# Patient Record
Sex: MALE | Race: WHITE | NOT HISPANIC OR LATINO
[De-identification: names, ages, dates, MRNs, and addresses within clinical notes are randomized per-mention and may not be internally consistent; named-entity substitution may affect disease eponyms.]

---

## 2017-02-06 ENCOUNTER — APPOINTMENT (OUTPATIENT)
Dept: PULMONOLOGY | Facility: CLINIC | Age: 72
End: 2017-02-06

## 2017-02-06 VITALS
OXYGEN SATURATION: 96 % | BODY MASS INDEX: 37.67 KG/M2 | RESPIRATION RATE: 17 BRPM | DIASTOLIC BLOOD PRESSURE: 60 MMHG | SYSTOLIC BLOOD PRESSURE: 120 MMHG | TEMPERATURE: 98.1 F | WEIGHT: 240 LBS | HEART RATE: 75 BPM | HEIGHT: 67 IN

## 2017-02-13 ENCOUNTER — RESULT REVIEW (OUTPATIENT)
Age: 72
End: 2017-02-13

## 2017-04-20 ENCOUNTER — APPOINTMENT (OUTPATIENT)
Dept: CARDIOLOGY | Facility: CLINIC | Age: 72
End: 2017-04-20

## 2017-04-20 ENCOUNTER — NON-APPOINTMENT (OUTPATIENT)
Age: 72
End: 2017-04-20

## 2017-04-20 VITALS
HEART RATE: 71 BPM | HEIGHT: 67 IN | SYSTOLIC BLOOD PRESSURE: 146 MMHG | WEIGHT: 238 LBS | BODY MASS INDEX: 37.35 KG/M2 | DIASTOLIC BLOOD PRESSURE: 75 MMHG | OXYGEN SATURATION: 98 %

## 2017-05-05 ENCOUNTER — APPOINTMENT (OUTPATIENT)
Dept: PULMONOLOGY | Facility: CLINIC | Age: 72
End: 2017-05-05

## 2017-05-05 VITALS
BODY MASS INDEX: 36.57 KG/M2 | HEIGHT: 67 IN | RESPIRATION RATE: 18 BRPM | SYSTOLIC BLOOD PRESSURE: 120 MMHG | WEIGHT: 233 LBS | HEART RATE: 67 BPM | OXYGEN SATURATION: 97 % | DIASTOLIC BLOOD PRESSURE: 80 MMHG | TEMPERATURE: 97.5 F

## 2017-10-26 ENCOUNTER — NON-APPOINTMENT (OUTPATIENT)
Age: 72
End: 2017-10-26

## 2017-10-26 ENCOUNTER — APPOINTMENT (OUTPATIENT)
Dept: CARDIOLOGY | Facility: CLINIC | Age: 72
End: 2017-10-26
Payer: MEDICARE

## 2017-10-26 VITALS — DIASTOLIC BLOOD PRESSURE: 71 MMHG | OXYGEN SATURATION: 98 % | HEART RATE: 72 BPM | SYSTOLIC BLOOD PRESSURE: 134 MMHG

## 2017-10-26 PROCEDURE — 99215 OFFICE O/P EST HI 40 MIN: CPT

## 2017-10-26 PROCEDURE — 93000 ELECTROCARDIOGRAM COMPLETE: CPT

## 2018-05-16 ENCOUNTER — APPOINTMENT (OUTPATIENT)
Dept: CARDIOLOGY | Facility: CLINIC | Age: 73
End: 2018-05-16
Payer: MEDICARE

## 2018-05-16 ENCOUNTER — NON-APPOINTMENT (OUTPATIENT)
Age: 73
End: 2018-05-16

## 2018-05-16 VITALS
OXYGEN SATURATION: 98 % | WEIGHT: 217 LBS | BODY MASS INDEX: 33.99 KG/M2 | DIASTOLIC BLOOD PRESSURE: 63 MMHG | HEART RATE: 74 BPM | SYSTOLIC BLOOD PRESSURE: 130 MMHG

## 2018-05-16 PROCEDURE — 99214 OFFICE O/P EST MOD 30 MIN: CPT

## 2018-05-16 PROCEDURE — 93000 ELECTROCARDIOGRAM COMPLETE: CPT

## 2018-11-28 ENCOUNTER — APPOINTMENT (OUTPATIENT)
Dept: PULMONOLOGY | Facility: CLINIC | Age: 73
End: 2018-11-28
Payer: MEDICARE

## 2018-11-28 VITALS
OXYGEN SATURATION: 96 % | WEIGHT: 220 LBS | HEART RATE: 75 BPM | DIASTOLIC BLOOD PRESSURE: 70 MMHG | TEMPERATURE: 97.9 F | RESPIRATION RATE: 15 BRPM | BODY MASS INDEX: 34.53 KG/M2 | HEIGHT: 67 IN | SYSTOLIC BLOOD PRESSURE: 140 MMHG

## 2018-11-28 PROCEDURE — 99214 OFFICE O/P EST MOD 30 MIN: CPT

## 2018-11-28 RX ORDER — MULTIVIT-MIN/FOLIC/VIT K/LYCOP 400-300MCG
50 MCG TABLET ORAL
Refills: 0 | Status: ACTIVE | COMMUNITY

## 2018-11-28 RX ORDER — PROMETHAZINE HYDROCHLORIDE AND CODEINE PHOSPHATE 6.25; 1 MG/5ML; MG/5ML
6.25-1 SOLUTION ORAL EVERY 4 HOURS
Qty: 100 | Refills: 0 | Status: COMPLETED | COMMUNITY
Start: 2017-04-20 | End: 2017-05-01

## 2018-12-01 ENCOUNTER — APPOINTMENT (OUTPATIENT)
Dept: CARDIOLOGY | Facility: CLINIC | Age: 73
End: 2018-12-01
Payer: MEDICARE

## 2018-12-01 ENCOUNTER — NON-APPOINTMENT (OUTPATIENT)
Age: 73
End: 2018-12-01

## 2018-12-01 VITALS
SYSTOLIC BLOOD PRESSURE: 125 MMHG | WEIGHT: 220 LBS | HEART RATE: 67 BPM | HEIGHT: 67 IN | DIASTOLIC BLOOD PRESSURE: 74 MMHG | OXYGEN SATURATION: 98 % | BODY MASS INDEX: 34.53 KG/M2

## 2018-12-01 PROCEDURE — 93000 ELECTROCARDIOGRAM COMPLETE: CPT

## 2018-12-01 PROCEDURE — 99214 OFFICE O/P EST MOD 30 MIN: CPT

## 2018-12-06 NOTE — PHYSICAL EXAM
[General Appearance - Well Developed] : well developed [Normal Appearance] : normal appearance [Well Groomed] : well groomed [General Appearance - Well Nourished] : well nourished [No Deformities] : no deformities [General Appearance - In No Acute Distress] : no acute distress [Normal Conjunctiva] : the conjunctiva exhibited no abnormalities [Eyelids - No Xanthelasma] : the eyelids demonstrated no xanthelasmas [Normal Oral Mucosa] : normal oral mucosa [No Oral Pallor] : no oral pallor [No Oral Cyanosis] : no oral cyanosis [Normal Jugular Venous A Waves Present] : normal jugular venous A waves present [Normal Jugular Venous V Waves Present] : normal jugular venous V waves present [No Jugular Venous Pompa A Waves] : no jugular venous pompa A waves [Respiration, Rhythm And Depth] : normal respiratory rhythm and effort [Exaggerated Use Of Accessory Muscles For Inspiration] : no accessory muscle use [Auscultation Breath Sounds / Voice Sounds] : lungs were clear to auscultation bilaterally [Heart Rate And Rhythm] : heart rate and rhythm were normal [Heart Sounds] : normal S1 and S2 [Murmurs] : no murmurs present [Abdomen Soft] : soft [Abdomen Tenderness] : non-tender [Abdomen Mass (___ Cm)] : no abdominal mass palpated [Abnormal Walk] : normal gait [Gait - Sufficient For Exercise Testing] : the gait was sufficient for exercise testing [Nail Clubbing] : no clubbing of the fingernails [Cyanosis, Localized] : no localized cyanosis [Petechial Hemorrhages (___cm)] : no petechial hemorrhages [Skin Color & Pigmentation] : normal skin color and pigmentation [] : no rash [No Venous Stasis] : no venous stasis [Skin Lesions] : no skin lesions [No Skin Ulcers] : no skin ulcer [No Xanthoma] : no  xanthoma was observed [Oriented To Time, Place, And Person] : oriented to person, place, and time [Affect] : the affect was normal [Mood] : the mood was normal [No Anxiety] : not feeling anxious [FreeTextEntry1] : right radial +2 pulse

## 2018-12-06 NOTE — HISTORY OF PRESENT ILLNESS
[FreeTextEntry1] : Bryon is returning for a follow-up.  \par \par No chest pains - no angina\par BP acceptable\par Taking lipid therapy - no side effects\par  \par \par \par

## 2018-12-06 NOTE — DISCUSSION/SUMMARY
[FreeTextEntry1] : Bryon Middleton is a 72 y/o with HTN and Hchol  - cath with severe small vessel CAD\par \par Cont with lipitor\par No angina\par BP acceptable\par Cont with present meds\par I remained concerned about his overall prognosis given his lack of exercise and weight.\par Will continue with aggressive medical therapy for now\par \par

## 2018-12-06 NOTE — REVIEW OF SYSTEMS
[Under Stress] : under stress [Negative] : Heme/Lymph [Dyspnea on exertion] : not dyspnea during exertion

## 2019-05-16 ENCOUNTER — APPOINTMENT (OUTPATIENT)
Dept: PULMONOLOGY | Facility: CLINIC | Age: 74
End: 2019-05-16
Payer: MEDICARE

## 2019-05-16 VITALS
HEIGHT: 67 IN | TEMPERATURE: 98 F | BODY MASS INDEX: 34.21 KG/M2 | HEART RATE: 70 BPM | DIASTOLIC BLOOD PRESSURE: 77 MMHG | SYSTOLIC BLOOD PRESSURE: 144 MMHG | OXYGEN SATURATION: 96 % | RESPIRATION RATE: 16 BRPM | WEIGHT: 218 LBS

## 2019-05-16 PROCEDURE — 99214 OFFICE O/P EST MOD 30 MIN: CPT | Mod: GC

## 2019-05-16 NOTE — REVIEW OF SYSTEMS
[EDS: ESS=____] : no daytime somnolence [Fatigue] : no fatigue [Nasal Congestion] : no nasal congestion [Snoring] : no snoring [Postnasal Drip] : no postnasal drip [Witnessed Apneas] : no witnessed apnea [Shortness Of Breath] : no shortness of breath [A.M. Dry Mouth] : no a.m. dry mouth [Chest Pain] : no chest pain [Obesity] : not obese [A.M. Headache] : no headache present upon awakening [Sleep Paralysis] : no sleep paralysis [Hypnogogic Hallucinations] : no hypnogogic hallucinations [Heartburn] : no heartburn [Nocturia] : no nocturia [Arthralgias] : no arthralgias [Difficulty Initiating Sleep] : no difficulty falling asleep [Difficulty Maintaining Sleep] : no difficulty maintaining sleep [Acute Insomnia] : no acute insomnia [Lower Extremity Discomfort] : no lower extremity discomfort [Irresistible urge to move legs] : no irresistible urge to move legs because of lower extremity discomfort [Late day/ Evening symptoms] : no late day/evening symptoms [Sleep Disturbances due to LE symptoms] : ~T no sleep disturbances due to lower extremity symptoms [Unusual Sleep Behavior] : no unusual sleep behavior [Hypersomnolence] : not sleeping much more than usual

## 2019-05-16 NOTE — HISTORY OF PRESENT ILLNESS
[FreeTextEntry1] : This 73 year year old male with severe obstructive sleep apnea treated with APAP therapy presents for follow-up \par \par COMORBID: CAD, HTN, HLD, ED, and obesity.\par \par The patient has been treated with APAP therapy since Jan/2017, after he was diagnosed with severe KAIDEN in Nov/2016. He was intolerant of bilevel. He reports almost nightly APAP use for 6-8 hours, with resolution of pre-therapeutic symptoms including snoring, witnessed apneas, hypersomnolence, and nonrestorative sleep. He has not been utilizing APAP when he travels. The patient reports no problems with therapy or with nasal pillows mask, except for morning dry mouth. \par \par He is sleeping approximately 7 hours between 11PM-6AM, taking 1/2 hour to fall asleep, with one brief awakening to urinate. He takes a 1/2-hour afternoon refeshing nap ~ 3 times weekly, and denies drowsy driving. \par \par Sleep related symptoms: ESS score 0. \par \par Date of most recent diagnostic polysomnogram: 11/23/2016 Split study at [facility]. Apnea-hypopnea index: 38.8 per hour. T90%: 13%. Satnam desaturation: 53%. \par    \par Therapeutic Polysomnogram:. the most recent therapeutic polysomnogram was completed 11/23/2016 Split study at [facility]. AHI of 13 on Bilevel 14/10 cmH2O with nasal mask. \par \par Treatment: CPAP: APAP 4-20 cmH2O. Set-up 1/17/2017 by [facility] with [employer] CPAP.\par \par  \par PAP Therapy Compliance:. Days used: 83.3 %. Days used > 4 hrs: 73.3 %. Mean nightly usage: 5 hrs 33 mins . Therapy based AHI: 4.4 / hr. \par  \par

## 2019-05-16 NOTE — PHYSICAL EXAM
[General Appearance - Well Developed] : well developed [Apical Impulse] : the apical impulse was normal [Neck Cervical Mass (___cm)] : no neck mass was observed [Neck Appearance] : the appearance of the neck was normal [Heart Rate And Rhythm] : heart rate was normal and rhythm regular [Heart Sounds Gallop] : no gallops [] : no respiratory distress [Bowel Sounds] : normal bowel sounds [Abdomen Tenderness] : non-tender [Abnormal Walk] : normal gait [Nail Clubbing] : no clubbing  or cyanosis of the fingernails [Low Lying Soft Palate] : low lying soft palate [Enlarged Base of the Tongue] : enlargement of the base of the tongue [Deep Tendon Reflexes (DTR)] : deep tendon reflexes were 2+ and symmetric [Sensation] : the sensory exam was normal to light touch and pinprick [No Focal Deficits] : no focal deficits [Oriented To Time, Place, And Person] : oriented to person, place, and time [Impaired Insight] : insight and judgment were intact [Affect] : the affect was normal

## 2019-05-16 NOTE — ASSESSMENT
[FreeTextEntry1] : 73 year old with comorbid CAD, HTN, HLD, ED, and obesity (BMI 34.5); presents for continued management of severe obstructive sleep apnea.\par \par The patient is benefitting from APAP therapy with continued resolution of KAIDEN-related symptoms and should continue APAP therapy at the current pressure settings. The importance of continuous APAP compliance while travelling was stressed, to reduce the health risk of untreated severe KAIDEN. He inquired about hypoglossal nerve stimulation therapy, the information was presented to the patient and the spouse. -- he stated he will continue with pap therpay and does not want to pursue inspire at this time.  A letter to travel with CPAP was provided. \par \par He will follow up in one year or sooner if needed.\par

## 2019-08-21 ENCOUNTER — APPOINTMENT (OUTPATIENT)
Dept: CARDIOLOGY | Facility: CLINIC | Age: 74
End: 2019-08-21

## 2019-10-10 ENCOUNTER — APPOINTMENT (OUTPATIENT)
Dept: CARDIOLOGY | Facility: CLINIC | Age: 74
End: 2019-10-10
Payer: MEDICARE

## 2019-10-10 ENCOUNTER — NON-APPOINTMENT (OUTPATIENT)
Age: 74
End: 2019-10-10

## 2019-10-10 VITALS
DIASTOLIC BLOOD PRESSURE: 67 MMHG | OXYGEN SATURATION: 97 % | SYSTOLIC BLOOD PRESSURE: 118 MMHG | HEIGHT: 67 IN | HEART RATE: 67 BPM

## 2019-10-10 PROCEDURE — 93000 ELECTROCARDIOGRAM COMPLETE: CPT

## 2019-10-10 PROCEDURE — 99214 OFFICE O/P EST MOD 30 MIN: CPT | Mod: 25

## 2019-10-23 NOTE — DISCUSSION/SUMMARY
[FreeTextEntry1] : Bryon Middleton is a 73 y/o with HTN and Hchol  - cath with severe small vessel CAD\par \par Cont with lipitor\par No angina\par BP acceptable\par Cont with present meds\par I remained concerned about his overall prognosis given his lack of exercise and weight.\par Will continue with aggressive medical therapy plan on repeat ischemic eval next year\par   \par \par

## 2019-10-23 NOTE — PHYSICAL EXAM
[General Appearance - Well Developed] : well developed [Normal Appearance] : normal appearance [General Appearance - Well Nourished] : well nourished [Well Groomed] : well groomed [No Deformities] : no deformities [General Appearance - In No Acute Distress] : no acute distress [Normal Conjunctiva] : the conjunctiva exhibited no abnormalities [Eyelids - No Xanthelasma] : the eyelids demonstrated no xanthelasmas [Normal Oral Mucosa] : normal oral mucosa [No Oral Cyanosis] : no oral cyanosis [No Oral Pallor] : no oral pallor [Normal Jugular Venous A Waves Present] : normal jugular venous A waves present [Normal Jugular Venous V Waves Present] : normal jugular venous V waves present [No Jugular Venous Pompa A Waves] : no jugular venous pompa A waves [Respiration, Rhythm And Depth] : normal respiratory rhythm and effort [Exaggerated Use Of Accessory Muscles For Inspiration] : no accessory muscle use [Auscultation Breath Sounds / Voice Sounds] : lungs were clear to auscultation bilaterally [Heart Sounds] : normal S1 and S2 [Heart Rate And Rhythm] : heart rate and rhythm were normal [Murmurs] : no murmurs present [Abdomen Tenderness] : non-tender [Abdomen Soft] : soft [Abdomen Mass (___ Cm)] : no abdominal mass palpated [Gait - Sufficient For Exercise Testing] : the gait was sufficient for exercise testing [Abnormal Walk] : normal gait [Nail Clubbing] : no clubbing of the fingernails [Cyanosis, Localized] : no localized cyanosis [Petechial Hemorrhages (___cm)] : no petechial hemorrhages [Skin Color & Pigmentation] : normal skin color and pigmentation [] : no rash [No Venous Stasis] : no venous stasis [Skin Lesions] : no skin lesions [No Skin Ulcers] : no skin ulcer [No Xanthoma] : no  xanthoma was observed [Oriented To Time, Place, And Person] : oriented to person, place, and time [Affect] : the affect was normal [Mood] : the mood was normal [No Anxiety] : not feeling anxious [FreeTextEntry1] : right radial +2 pulse

## 2019-10-23 NOTE — HISTORY OF PRESENT ILLNESS
[FreeTextEntry1] : Bryon is returning for a follow-up.  \par \par Was recently in [facility]. Felt well. No health issues over that period of time.\par No chest pains - no angina\par BP acceptable\par Taking lipid therapy - no side effects\par  \par \par \par

## 2020-06-18 ENCOUNTER — NON-APPOINTMENT (OUTPATIENT)
Age: 75
End: 2020-06-18

## 2020-06-18 ENCOUNTER — APPOINTMENT (OUTPATIENT)
Dept: CARDIOLOGY | Facility: CLINIC | Age: 75
End: 2020-06-18
Payer: MEDICARE

## 2020-06-18 VITALS
WEIGHT: 200 LBS | HEART RATE: 71 BPM | HEIGHT: 67 IN | OXYGEN SATURATION: 98 % | BODY MASS INDEX: 31.39 KG/M2 | DIASTOLIC BLOOD PRESSURE: 75 MMHG | SYSTOLIC BLOOD PRESSURE: 129 MMHG

## 2020-06-18 PROCEDURE — 99213 OFFICE O/P EST LOW 20 MIN: CPT

## 2020-06-18 PROCEDURE — 93000 ELECTROCARDIOGRAM COMPLETE: CPT

## 2020-06-18 NOTE — REASON FOR VISIT
[Follow-Up - Clinic] : a clinic follow-up of [Coronary Artery Disease] : coronary artery disease [Hypertension] : hypertension [Medication Management] : Medication management [Hyperlipidemia] : hyperlipidemia

## 2020-06-20 NOTE — HISTORY OF PRESENT ILLNESS
[FreeTextEntry1] : Bryon is returning for a follow-up.  \par  \par No chest pains - no angina\par BP acceptable\par Taking lipid therapy - no side effects\par Hasnt lost sign weight\par Does garden work without limitations\par \par \par \par

## 2020-06-20 NOTE — PHYSICAL EXAM
[Normal Appearance] : normal appearance [General Appearance - Well Developed] : well developed [Well Groomed] : well groomed [General Appearance - Well Nourished] : well nourished [No Deformities] : no deformities [General Appearance - In No Acute Distress] : no acute distress [Normal Conjunctiva] : the conjunctiva exhibited no abnormalities [Eyelids - No Xanthelasma] : the eyelids demonstrated no xanthelasmas [Normal Oral Mucosa] : normal oral mucosa [No Oral Pallor] : no oral pallor [No Oral Cyanosis] : no oral cyanosis [Normal Jugular Venous A Waves Present] : normal jugular venous A waves present [Normal Jugular Venous V Waves Present] : normal jugular venous V waves present [No Jugular Venous Pompa A Waves] : no jugular venous pompa A waves [Respiration, Rhythm And Depth] : normal respiratory rhythm and effort [Exaggerated Use Of Accessory Muscles For Inspiration] : no accessory muscle use [Auscultation Breath Sounds / Voice Sounds] : lungs were clear to auscultation bilaterally [Heart Sounds] : normal S1 and S2 [Heart Rate And Rhythm] : heart rate and rhythm were normal [Murmurs] : no murmurs present [Abdomen Tenderness] : non-tender [Abdomen Soft] : soft [Abdomen Mass (___ Cm)] : no abdominal mass palpated [Abnormal Walk] : normal gait [Gait - Sufficient For Exercise Testing] : the gait was sufficient for exercise testing [Cyanosis, Localized] : no localized cyanosis [Nail Clubbing] : no clubbing of the fingernails [Petechial Hemorrhages (___cm)] : no petechial hemorrhages [Skin Color & Pigmentation] : normal skin color and pigmentation [No Venous Stasis] : no venous stasis [] : no rash [No Skin Ulcers] : no skin ulcer [Skin Lesions] : no skin lesions [No Xanthoma] : no  xanthoma was observed [Oriented To Time, Place, And Person] : oriented to person, place, and time [Affect] : the affect was normal [Mood] : the mood was normal [No Anxiety] : not feeling anxious [FreeTextEntry1] : right radial +2 pulse

## 2020-06-20 NOTE — DISCUSSION/SUMMARY
[FreeTextEntry1] : Bryon Middleton is a 74 y/o with HTN and Hchol  - cath with severe small vessel CAD\par \par Cont with lipitor\par No angina\par BP acceptable\par Cont with present meds\par   \par \par

## 2020-12-03 ENCOUNTER — APPOINTMENT (OUTPATIENT)
Dept: CARDIOLOGY | Facility: CLINIC | Age: 75
End: 2020-12-03
Payer: MEDICARE

## 2020-12-03 ENCOUNTER — NON-APPOINTMENT (OUTPATIENT)
Age: 75
End: 2020-12-03

## 2020-12-03 VITALS — OXYGEN SATURATION: 99 % | DIASTOLIC BLOOD PRESSURE: 56 MMHG | HEART RATE: 65 BPM | SYSTOLIC BLOOD PRESSURE: 120 MMHG

## 2020-12-03 PROCEDURE — 93000 ELECTROCARDIOGRAM COMPLETE: CPT

## 2020-12-03 PROCEDURE — 99213 OFFICE O/P EST LOW 20 MIN: CPT

## 2020-12-03 PROCEDURE — 99072 ADDL SUPL MATRL&STAF TM PHE: CPT

## 2020-12-03 NOTE — REVIEW OF SYSTEMS
[Under Stress] : under stress [Negative] : Heme/Lymph [Feeling Fatigued] : feeling fatigued [Dyspnea on exertion] : not dyspnea during exertion

## 2020-12-03 NOTE — PHYSICAL EXAM
[General Appearance - Well Developed] : well developed [Normal Appearance] : normal appearance [Well Groomed] : well groomed [General Appearance - Well Nourished] : well nourished [No Deformities] : no deformities [General Appearance - In No Acute Distress] : no acute distress [Normal Conjunctiva] : the conjunctiva exhibited no abnormalities [Eyelids - No Xanthelasma] : the eyelids demonstrated no xanthelasmas [Normal Oral Mucosa] : normal oral mucosa [No Oral Pallor] : no oral pallor [No Oral Cyanosis] : no oral cyanosis [Normal Jugular Venous A Waves Present] : normal jugular venous A waves present [Normal Jugular Venous V Waves Present] : normal jugular venous V waves present [No Jugular Venous Pompa A Waves] : no jugular venous pompa A waves [Respiration, Rhythm And Depth] : normal respiratory rhythm and effort [Exaggerated Use Of Accessory Muscles For Inspiration] : no accessory muscle use [Auscultation Breath Sounds / Voice Sounds] : lungs were clear to auscultation bilaterally [Heart Rate And Rhythm] : heart rate and rhythm were normal [Heart Sounds] : normal S1 and S2 [Murmurs] : no murmurs present [Abdomen Soft] : soft [Abdomen Tenderness] : non-tender [Abdomen Mass (___ Cm)] : no abdominal mass palpated [Abnormal Walk] : normal gait [Gait - Sufficient For Exercise Testing] : the gait was sufficient for exercise testing [Nail Clubbing] : no clubbing of the fingernails [Cyanosis, Localized] : no localized cyanosis [Petechial Hemorrhages (___cm)] : no petechial hemorrhages [Skin Color & Pigmentation] : normal skin color and pigmentation [] : no rash [No Venous Stasis] : no venous stasis [Skin Lesions] : no skin lesions [No Skin Ulcers] : no skin ulcer [No Xanthoma] : no  xanthoma was observed [Oriented To Time, Place, And Person] : oriented to person, place, and time [Affect] : the affect was normal [Mood] : the mood was normal [No Anxiety] : not feeling anxious [Edema] : no peripheral edema present [FreeTextEntry1] : right radial +2 pulse

## 2020-12-03 NOTE — HISTORY OF PRESENT ILLNESS
[FreeTextEntry1] : Bryon is returning for a follow-up.  \par  \par No chest pains - no angina\par BP acceptable\par Taking lipid therapy - no side effects\par Does garden work without limitations\par No LE edema\par \par \par \par

## 2020-12-04 LAB
ALBUMIN SERPL ELPH-MCNC: 4.8 G/DL
ALP BLD-CCNC: 57 U/L
ALT SERPL-CCNC: 16 U/L
ANION GAP SERPL CALC-SCNC: 10 MMOL/L
AST SERPL-CCNC: 15 U/L
BILIRUB SERPL-MCNC: 0.6 MG/DL
BUN SERPL-MCNC: 14 MG/DL
CALCIUM SERPL-MCNC: 9.8 MG/DL
CHLORIDE SERPL-SCNC: 99 MMOL/L
CHOLEST SERPL-MCNC: 123 MG/DL
CO2 SERPL-SCNC: 27 MMOL/L
CREAT SERPL-MCNC: 0.78 MG/DL
ESTIMATED AVERAGE GLUCOSE: 131 MG/DL
GLUCOSE SERPL-MCNC: 144 MG/DL
HBA1C MFR BLD HPLC: 6.2 %
HDLC SERPL-MCNC: 46 MG/DL
LDLC SERPL CALC-MCNC: 58 MG/DL
NONHDLC SERPL-MCNC: 77 MG/DL
POTASSIUM SERPL-SCNC: 4.5 MMOL/L
PROT SERPL-MCNC: 7.8 G/DL
PSA FREE FLD-MCNC: 43 %
PSA FREE SERPL-MCNC: 0.38 NG/ML
PSA SERPL-MCNC: 0.89 NG/ML
SODIUM SERPL-SCNC: 135 MMOL/L
TRIGL SERPL-MCNC: 95 MG/DL
TSH SERPL-ACNC: 2.52 UIU/ML

## 2020-12-28 ENCOUNTER — NON-APPOINTMENT (OUTPATIENT)
Age: 75
End: 2020-12-28

## 2021-02-02 ENCOUNTER — APPOINTMENT (OUTPATIENT)
Dept: PULMONOLOGY | Facility: CLINIC | Age: 76
End: 2021-02-02

## 2021-02-02 ENCOUNTER — APPOINTMENT (OUTPATIENT)
Dept: PULMONOLOGY | Facility: CLINIC | Age: 76
End: 2021-02-02
Payer: MEDICARE

## 2021-02-02 PROCEDURE — 99442: CPT

## 2021-02-02 NOTE — HISTORY OF PRESENT ILLNESS
[CPAP: ___ cmH2O] : CPAP: [unfilled] cmH2O [FreeTextEntry1] : This is a 75 year old male with severe KAIDEN, on APAP, with comorbid HTN, CAD, HLD, BPH, ED, presenting via telehealth for a follow-up visit. \par \par Split Sleep Study (11/23/2016) from an outside sleep center showed an AHI of 38.8/hr with a T 90 of 13 %, and optimal bilevel pressures of 14/10 cmH20. The patient was intolerant to Bilevel and he has been treated with APAP therapy since 2017. He uses APAP nightly for the duration of his sleep with continued improvement in his sleep quality with resolution to snoring. He denies AM headaches, unintentional sleep episodes during his wake hours, and drowsy driving. He sleeps 7-8 hours / night and feels refreshed in the mornings. He naps once a day after lunch for 15-20 minutes. He uses a nasal pillows mask which he finds comfortable but reports morning dry mouth. Patient and spouse denies nocturnal mouth breathing. The pressures are well tolerated. The [facility] has been providing a renewal of supplies regularly. \par \par The patient's wife is requesting a letter to be sent to their electric company, [employer] notifying them of PAP usage. \par \par No interim changes to his health and weight reported.  [Nocturnal Oxygen] : The patient does not use nocturnal oxygen

## 2021-02-02 NOTE — PHYSICAL EXAM
[General Appearance - In No Acute Distress] : no acute distress [FreeTextEntry1] : TELEPHONE VISIT --  PATIENT ABLE TO SPEAK IN COMPLETE FULL SENTENCES AND ANSWER QUESTIONS WITHOUT DIFFICULTY

## 2021-02-02 NOTE — REVIEW OF SYSTEMS
[A.M. Dry Mouth] : a.m. dry mouth [Awakes With Dry Mouth] : awakes with dry mouth [Fatigue] : no fatigue [Nasal Congestion] : no nasal congestion [Postnasal Drip] : no postnasal drip [Shortness Of Breath] : no shortness of breath [Chest Pain] : no chest pain [Palpitations] : no palpitations [CHF] : no congestive heart failure [Thyroid Disease] : no thyroid disease [Diabetes] : no diabetes  [History of Iron Deficiency] : no history of iron deficiency [A.M. Headache] : no headache present upon awakening [Heartburn] : no heartburn [Nocturia] : no nocturia [Arthralgias] : no arthralgias [Depression] : no depression [Anxious] : not anxious [Snoring] : no snoring [Witnessed Apneas] : demonstrated no ~M apnea [Frequent Nocturnal Awakenings] : no nocturnal awakenings from sleep [Unintentional Sleep while active] : no unintentional sleep while active [Unintentional Sleep while inactive] : no unintentional sleep while inactive [Awakes Unrefreshed] : restorative sleep [Awakes With Headache] : awakes without a headache [Recent Wt Loss (___ Lbs)] : no recent weight loss [Recent Wt Gain (___ Lbs)] : no recent weight gain [Difficulty Initiating Sleep] : no difficulty falling asleep [Difficulty Maintaining Sleep] : no difficulty maintaining sleep [Irresistible urge to move legs] : no irresistible urge to move legs because of lower extremity discomfort [LE discomfort relieved by movement] : lower extremity discomfort not relieved by movement [Unusual Sleep Behavior] : no unusual sleep behavior [Hypersomnolence] : not sleeping much more than usual [Cataplexy] :  no cataplexy [Hypnogogic Hallucinations] : no hypnogogic hallucinations [Hypnopompic Hallucinations] : no hypnopompic hallucinations [Sleep Paralysis] : no sleep paralysis [FreeTextEntry1] : 11 - 11:30 pm  [FreeTextEntry2] : 7 am  [FreeTextEntry3] : 10 minutes  [FreeTextEntry4] : 0 [FreeTextEntry5] : n/a [FreeTextEntry6] : 7-8 hours  [FreeTextEntry7] : once a day after lunch around 2/3 pm for 15-20 minutes

## 2021-02-02 NOTE — REASON FOR VISIT
[Follow-Up] : a follow-up visit [Sleep Apnea] : sleep apnea [Home] : at home, [unfilled] , at the time of the visit. [Medical Office: ([facility])___] : at the medical office located in  [Spouse] : spouse [Verbal consent obtained from patient] : the patient, [unfilled]

## 2021-02-02 NOTE — ASSESSMENT
[FreeTextEntry1] : 76 y/o male with h/o severe KAIDEN (AHI 38.8/hr, diagnosed in 2016 via Split Sleep Study at an outside center), intolerant to Bilevel therapy, currently on APAP, with comorbid HTN, CAD, HLD, BPH, ED, here via telehealth for a follow-up visit. \par \par Therapeutic and compliance data download reveals usage 100% of days with an average use of 6 hours and 22 minutes. Therapy based AHI is 5.4/hr on 4-20 cm H2O, median pressure of 8.3 cmH20;with no significant mask leak.\par \par --The patient is experiencing benefit from APAP and will continue with nightly use as prescribed.\par --Advised patient to increase humidifier level for AM dry mouth in the setting of no mask leak and patient is not a nocturnal mouth breather.\par --Letter to [employer] in [employer] already from 12/28/20 was faxed to [employer] at [phone], as requested by patient's spouse and patient. \par --Rx for renewal of pap supplies x 1 year placed with [facility]\par The ramifications of untreated KAIDEN and the importance of continued treatment were discussed with the patient.\par F/u in 1 year or sooner if needed.\par

## 2021-05-25 ENCOUNTER — APPOINTMENT (OUTPATIENT)
Dept: BARIATRICS/WEIGHT MGMT | Facility: CLINIC | Age: 76
End: 2021-05-25

## 2021-06-03 ENCOUNTER — APPOINTMENT (OUTPATIENT)
Dept: CARDIOLOGY | Facility: CLINIC | Age: 76
End: 2021-06-03
Payer: MEDICARE

## 2021-06-03 VITALS
OXYGEN SATURATION: 98 % | HEART RATE: 67 BPM | BODY MASS INDEX: 33.74 KG/M2 | SYSTOLIC BLOOD PRESSURE: 124 MMHG | HEIGHT: 67 IN | DIASTOLIC BLOOD PRESSURE: 60 MMHG | WEIGHT: 215 LBS

## 2021-06-03 PROCEDURE — 99072 ADDL SUPL MATRL&STAF TM PHE: CPT

## 2021-06-03 PROCEDURE — 93000 ELECTROCARDIOGRAM COMPLETE: CPT

## 2021-06-03 PROCEDURE — 99213 OFFICE O/P EST LOW 20 MIN: CPT

## 2021-06-04 ENCOUNTER — NON-APPOINTMENT (OUTPATIENT)
Age: 76
End: 2021-06-04

## 2021-06-04 NOTE — HISTORY OF PRESENT ILLNESS
[FreeTextEntry1] : Bryon is returning for a follow-up.  \par  \par No chest pains - no angina\par BP acceptable\par Taking lipid therapy - no side effects\par No LE edema\par Has not lost weight\par \par \par

## 2021-06-04 NOTE — REASON FOR VISIT
[Follow-Up - Clinic] : a clinic follow-up of [Medication Management] : Medication management [Hyperlipidemia] : hyperlipidemia [Hypertension] : hypertension [Coronary Artery Disease] : coronary artery disease

## 2021-06-04 NOTE — PHYSICAL EXAM
[Well Developed] : well developed [Well Nourished] : well nourished [No Acute Distress] : no acute distress [Normal Conjunctiva] : normal conjunctiva [Normal Venous Pressure] : normal venous pressure [Carotid Bruit] : carotid bruit [Normal S1, S2] : normal S1, S2 [No Murmur] : no murmur [No Rub] : no rub [No Gallop] : no gallop [Clear Lung Fields] : clear lung fields [Good Air Entry] : good air entry [No Respiratory Distress] : no respiratory distress  [Soft] : abdomen soft [Non Tender] : non-tender [No Masses/organomegaly] : no masses/organomegaly [Normal Bowel Sounds] : normal bowel sounds [Normal Gait] : normal gait [No Edema] : no edema [No Cyanosis] : no cyanosis [No Clubbing] : no clubbing [No Varicosities] : no varicosities [No Rash] : no rash [No Skin Lesions] : no skin lesions [Moves all extremities] : moves all extremities [No Focal Deficits] : no focal deficits [Normal Speech] : normal speech [Alert and Oriented] : alert and oriented [Normal memory] : normal memory [de-identified] : right carotid bruit

## 2021-06-04 NOTE — CARDIOLOGY SUMMARY
[de-identified] : 6/3/21\par Sinus  Rhythm \par -Right bundle branch block with left axis -bifascicular block. \par \par ABNORMAL

## 2021-06-04 NOTE — DISCUSSION/SUMMARY
[FreeTextEntry1] : Bryon Middleton is a 76 y/o with HTN and Hchol  - cath with severe small vessel CAD\par \par Cont with lipitor\par No angina\par BP acceptable\par Cont with present meds\par Ecnouraged weight loss\par \par I remain very concerned given his weight and lack of exercise regarding his future risk of MI and stroke. I have encouraged a better lifestyle.\par   \par \par

## 2021-06-08 ENCOUNTER — APPOINTMENT (OUTPATIENT)
Dept: ULTRASOUND IMAGING | Facility: CLINIC | Age: 76
End: 2021-06-08
Payer: MEDICARE

## 2021-06-08 ENCOUNTER — OUTPATIENT (OUTPATIENT)
Dept: OUTPATIENT SERVICES | Facility: HOSPITAL | Age: 76
LOS: 1 days | End: 2021-06-08
Payer: MEDICARE

## 2021-06-08 DIAGNOSIS — I25.10 ATHEROSCLEROTIC HEART DISEASE OF NATIVE CORONARY ARTERY WITHOUT ANGINA PECTORIS: ICD-10-CM

## 2021-06-08 PROCEDURE — 93880 EXTRACRANIAL BILAT STUDY: CPT | Mod: 26

## 2021-06-08 PROCEDURE — 93880 EXTRACRANIAL BILAT STUDY: CPT

## 2021-06-18 ENCOUNTER — NON-APPOINTMENT (OUTPATIENT)
Age: 76
End: 2021-06-18

## 2021-09-08 ENCOUNTER — NON-APPOINTMENT (OUTPATIENT)
Age: 76
End: 2021-09-08

## 2021-12-08 ENCOUNTER — RESULT CHARGE (OUTPATIENT)
Age: 76
End: 2021-12-08

## 2021-12-09 ENCOUNTER — NON-APPOINTMENT (OUTPATIENT)
Age: 76
End: 2021-12-09

## 2021-12-09 ENCOUNTER — APPOINTMENT (OUTPATIENT)
Dept: CARDIOLOGY | Facility: CLINIC | Age: 76
End: 2021-12-09
Payer: MEDICARE

## 2021-12-09 VITALS
DIASTOLIC BLOOD PRESSURE: 75 MMHG | WEIGHT: 215 LBS | HEIGHT: 67 IN | SYSTOLIC BLOOD PRESSURE: 146 MMHG | OXYGEN SATURATION: 98 % | HEART RATE: 67 BPM | BODY MASS INDEX: 33.74 KG/M2

## 2021-12-09 PROCEDURE — 99214 OFFICE O/P EST MOD 30 MIN: CPT

## 2021-12-09 PROCEDURE — 93000 ELECTROCARDIOGRAM COMPLETE: CPT

## 2021-12-09 NOTE — DISCUSSION/SUMMARY
[FreeTextEntry1] : Bryon Middleton is a 75 y/o with HTN and Hchol  - cath with severe small vessel CAD\par \par Cont with lipitor\par No angina\par BP acceptable\par Cont with present meds\par Encouraged weight loss\par \par I remain very concerned given his weight and lack of exercise regarding his future risk of MI and stroke. I again have encouraged a better lifestyle.\par   \par \par

## 2021-12-09 NOTE — CARDIOLOGY SUMMARY
[de-identified] : 12/9/21\par Sinus  Rhythm \par -Right bundle branch block with left axis -bifascicular block. \par \par ABNORMAL

## 2021-12-13 ENCOUNTER — APPOINTMENT (OUTPATIENT)
Dept: INTERNAL MEDICINE | Facility: CLINIC | Age: 76
End: 2021-12-13
Payer: MEDICARE

## 2021-12-13 VITALS
TEMPERATURE: 98.4 F | WEIGHT: 225 LBS | HEART RATE: 64 BPM | SYSTOLIC BLOOD PRESSURE: 124 MMHG | BODY MASS INDEX: 35.31 KG/M2 | OXYGEN SATURATION: 97 % | DIASTOLIC BLOOD PRESSURE: 72 MMHG | HEIGHT: 67 IN

## 2021-12-13 PROCEDURE — G0439: CPT

## 2021-12-13 PROCEDURE — G0442 ANNUAL ALCOHOL SCREEN 15 MIN: CPT

## 2021-12-13 PROCEDURE — G0447 BEHAVIOR COUNSEL OBESITY 15M: CPT

## 2021-12-13 PROCEDURE — G0444 DEPRESSION SCREEN ANNUAL: CPT | Mod: 59

## 2021-12-13 NOTE — ASSESSMENT
[FreeTextEntry1] : \par Preventive visit: patient is up to date with vaccines; he is up to date with colonoscopy screening and is due for prostate cancer screening via [facility] and PSA testing today; he does not smoke cigarettes and does not misuse alcohol; he feels safe at home and has smoke/CO detectors in the house; he wears seatbelts when in vehicles\par \par Depression screen performed today, PHQ2=0, negative.\par \par Annual alcohol misuse screen, 15 mins, done; negative.\par \par Obesity counselling: 15 mins, assessed BMI at 30 and above now in obese range; advised weight loss, exercise routine and diet; patient agreed to start exercise program for target weight loss 20 lbs; assisted patient with resources including nutrition referral as needed and arranged for follow-up to monitor weight loss progress over next several months\par \par \par

## 2021-12-13 NOTE — PHYSICAL EXAM

## 2021-12-13 NOTE — HISTORY OF PRESENT ILLNESS
[FreeTextEntry1] : Establish care for preventive visit. [de-identified] : \par Preventive visit: patient is up to date with vaccines; he is up to date with colonoscopy screening and is due for prostate cancer screening via [facility] and PSA testing today; he does not smoke cigarettes and does not misuse alcohol; he feels safe at home and has smoke/CO detectors in the house; he wears seatbelts when in vehicles\par

## 2021-12-13 NOTE — HEALTH RISK ASSESSMENT
[Good] : ~his/her~  mood as  good [Never] : Never [1 or 2 (0 pts)] : 1 or 2 (0 points) [Never (0 pts)] : Never (0 points) [No] : In the past 12 months have you used drugs other than those required for medical reasons? No [No falls in past year] : Patient reported no falls in the past year [0] : 2) Feeling down, depressed, or hopeless: Not at all (0) [PHQ-2 Negative - No further assessment needed] : PHQ-2 Negative - No further assessment needed [None] : None [With Family] : lives with family [Retired] : retired [Feels Safe at Home] : Feels safe at home [Fully functional (bathing, dressing, toileting, transferring, walking, feeding)] : Fully functional (bathing, dressing, toileting, transferring, walking, feeding) [Fully functional (using the telephone, shopping, preparing meals, housekeeping, doing laundry, using] : Fully functional and needs no help or supervision to perform IADLs (using the telephone, shopping, preparing meals, housekeeping, doing laundry, using transportation, managing medications and managing finances) [Reports normal functional visual acuity (ie: able to read med bottle)] : Reports normal functional visual acuity [Smoke Detector] : smoke detector [Carbon Monoxide Detector] : carbon monoxide detector [Safety elements used in home] : safety elements used in home [Seat Belt] :  uses seat belt [Sunscreen] : uses sunscreen [Reviewed no changes] : Reviewed, no changes [Audit-CScore] : 0 [[id number]] : 0 [Change in mental status noted] : No change in mental status noted [Language] : denies difficulty with language [Behavior] : denies difficulty with behavior [Learning/Retaining New Information] : denies difficulty learning/retaining new information [Handling Complex Tasks] : denies difficulty handling complex tasks [Reasoning] : denies difficulty with reasoning [Spatial Ability and Orientation] : denies difficulty with spatial ability and orientation [High Risk Behavior] : no high risk behavior [Reports changes in hearing] : Reports no changes in hearing [Reports changes in vision] : Reports no changes in vision [Reports changes in dental health] : Reports no changes in dental health [Guns at Home] : no guns at home [Travel to Developing Areas] : does not  travel to developing areas [TB Exposure] : is not being exposed to tuberculosis [Caregiver Concerns] : does not have caregiver concerns [AdvancecareDate] : 12/21

## 2022-01-10 ENCOUNTER — APPOINTMENT (OUTPATIENT)
Dept: PULMONOLOGY | Facility: CLINIC | Age: 77
End: 2022-01-10

## 2022-02-08 ENCOUNTER — APPOINTMENT (OUTPATIENT)
Dept: PULMONOLOGY | Facility: CLINIC | Age: 77
End: 2022-02-08
Payer: COMMERCIAL

## 2022-02-08 VITALS
SYSTOLIC BLOOD PRESSURE: 133 MMHG | OXYGEN SATURATION: 98 % | BODY MASS INDEX: 34.53 KG/M2 | WEIGHT: 220 LBS | DIASTOLIC BLOOD PRESSURE: 69 MMHG | HEART RATE: 75 BPM | HEIGHT: 67 IN | TEMPERATURE: 98.1 F

## 2022-02-08 PROCEDURE — 99213 OFFICE O/P EST LOW 20 MIN: CPT

## 2022-02-08 RX ORDER — MULTIVIT-MIN/FOLIC/VIT K/LYCOP 400-300MCG
1000 TABLET ORAL
Refills: 0 | Status: ACTIVE | COMMUNITY

## 2022-02-08 NOTE — PHYSICAL EXAM
[Normal Appearance] : normal appearance [Well Groomed] : well groomed [General Appearance - In No Acute Distress] : no acute distress [Normal Conjunctiva] : the conjunctiva exhibited no abnormalities [Neck Appearance] : the appearance of the neck was normal [Involuntary Movements] : no involuntary movements were seen [No Focal Deficits] : no focal deficits [Affect] : the affect was normal [Mood] : the mood was normal [Heart Rate And Rhythm] : heart rate was normal and rhythm regular [Murmurs] : no murmurs [] : no respiratory distress [Auscultation Breath Sounds / Voice Sounds] : lungs were clear to auscultation bilaterally [FreeTextEntry2] : no edema

## 2022-02-08 NOTE — HISTORY OF PRESENT ILLNESS
[FreeTextEntry1] : 76 year old male ROLANDO PERAZA with severe obstructive sleep apnea treated with APAP since 2017, presents for annual visit.  Comorbid: coronary artery disease, hypertension, hyperlipidemia, erectile dysfunction, obesity class I, and BPH.\par \par SEVERE KAIDEN (AHI 38.8) on APAP 4-20 cmH2O using a nasal pillows mask.\par \par The patient was diagnosed with severe KAIDEN at [facility] in Nov/2016, and has been treated with APAP since Jan/2017.  With APAP use for the duration of sleep, prior snoring and witnessed apnea have resolved, sleep quality has improved. He registered current recalled APAP.  C/O a.m. dry mouth persists.  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  No interim changes in health or weight.\par \par SLEEPS 7-8 hours between 11pm-7am. SOL 10-15 minutes. One brief awakening at 3 am to urinate.  Occasional 10-15 minute afternoon nap.\par \par •	DX: 11/23/2016 Split study:  AHI 38.8. T90 13%. Min sat 53%. Incomplete resolution with CPAP. \par •	.....Bilevel 14/10 cmH2O reduced AHI to 2.1. Wisp XL NMask.\par •	12/2016 apnea signs/symptoms: snoring, witnessed apnea, a.m.dry mouth. BMI 36. FTP 4. \par •	TX: 1/17/2017 DreamStation Auto CPAP from [facility] (recalled)\par •	DATA 2/7/2022: 100% compliant, Avg 7h 7m. AHI 5.9. Avg P 8.1 cmH2O. Large leak avg 8 secs. [ESS] : 6

## 2022-02-08 NOTE — HISTORY OF PRESENT ILLNESS
[FreeTextEntry1] : 76 year old male ROLANDO PERAZA with severe obstructive sleep apnea treated with APAP since 2017, presents for annual visit.  Comorbid: coronary artery disease, hypertension, hyperlipidemia, erectile dysfunction, obesity class I, and BPH.\par \par SEVERE KAIDEN (AHI 38.8) on APAP 4-20 cmH2O using a nasal pillows mask.\par \par The patient was diagnosed with severe KAIDEN at [facility] in Nov/2016, and has been treated with APAP since Jan/2017.  With APAP use for the duration of sleep, prior snoring and witnessed apnea have resolved, sleep quality has improved. He registered current recalled APAP.  C/O a.m. dry mouth persists.  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  No interim changes in health or weight.\par \par SLEEPS 7-8 hours between 11pm-7am. SOL 10-15 minutes. One brief awakening at 3 am to urinate.  Occasional 10-15 minute afternoon nap.\par \par •	DX: 11/23/2016 Split study:  AHI 38.8. T90 13%. Min sat 53%. Incomplete resolution with CPAP. \par •	.....Bilevel 14/10 cmH2O reduced AHI to 2.1. Wisp XL NMask.\par •	12/2016 apnea signs/symptoms: snoring, witnessed apnea, a.m.dry mouth. BMI 36. FTP 4. \par •	TX: 1/17/2017 DreamStation Auto CPAP from [facility] (recalled)\par •	DATA 2/7/2022: 100% compliant, Avg 7h 7m. AHI 5.9. Avg P 8.1 cmH2O. Large leak avg 8 secs. [ESS] : 6

## 2022-02-08 NOTE — REVIEW OF SYSTEMS
[Obesity] : obesity [Diabetes] : diabetes  [Unintentional Sleep while inactive] : unintentional sleep while inactive [Awakes With Dry Mouth] : awakes with dry mouth [Negative] : Psychiatric [Fatigue] : no fatigue [Recent Wt Loss (___ Lbs)] : no recent weight loss [Thyroid Disease] : no thyroid disease [Snoring] : no snoring [Witnessed Apneas] : demonstrated no ~M apnea [Frequent Nocturnal Awakenings] : no nocturnal awakenings from sleep [Daytime Somnolence: ESS=____] : no daytime somnolence [Unintentional Sleep while active] : no unintentional sleep while active [Awakes Unrefreshed] : restorative sleep [Awakes With Headache] : awakes without a headache [Recent Wt Gain (___ Lbs)] : no recent weight gain [Difficulty Initiating Sleep] : no difficulty falling asleep [Difficulty Maintaining Sleep] : no difficulty maintaining sleep [Lower Extremity Discomfort] : no lower extremity discomfort [Unusual Sleep Behavior] : no unusual sleep behavior

## 2022-02-08 NOTE — ASSESSMENT
[FreeTextEntry1] : 76 year old male ROLANDO PERAZA continues to derive benefit from APAP for severe obstructive sleep apnea since 2017.  Comorbid:  CAD, HTN, HLD, ED, obesity class I, and BPH.\par \par SEVERE KAIDEN (AHI 38.8) on APAP 4-20 cmH2O using a nasal pillows mask.\par \par  ·	APAP therapy improved sleep quality and resolved prior snoring, witnessed apnea.   \par ·	Gilbert Sleepiness Scale score is 6.\par •	Data: Excellent compliance, Average 7hrs 7mins. AHI 5.9. Insignificant mask leak\par •	Registered recalled 1/17/2017 DreamStation Auto CPAP with [facility].\par \par PLAN:\par ·	Patient continues use of recalled CPAP until it is replaced by [facility].\par ·	Renewal of supplies from [facility] to continue therapy.\par ·	Follow-up in one year or sooner if needed.\par

## 2022-02-08 NOTE — ASSESSMENT
[FreeTextEntry1] : 76 year old male ROLANDO PERAZA continues to derive benefit from APAP for severe obstructive sleep apnea since 2017.  Comorbid:  CAD, HTN, HLD, ED, obesity class I, and BPH.\par \par SEVERE KAIDEN (AHI 38.8) on APAP 4-20 cmH2O using a nasal pillows mask.\par \par  ·	APAP therapy improved sleep quality and resolved prior snoring, witnessed apnea.   \par ·	Bovey Sleepiness Scale score is 6.\par •	Data: Excellent compliance, Average 7hrs 7mins. AHI 5.9. Insignificant mask leak\par •	Registered recalled 1/17/2017 DreamStation Auto CPAP with [facility].\par \par PLAN:\par ·	Patient continues use of recalled CPAP until it is replaced by [facility].\par ·	Renewal of supplies from [facility] to continue therapy.\par ·	Follow-up in one year or sooner if needed.\par

## 2022-04-19 ENCOUNTER — APPOINTMENT (OUTPATIENT)
Dept: INTERNAL MEDICINE | Facility: CLINIC | Age: 77
End: 2022-04-19

## 2022-04-25 ENCOUNTER — APPOINTMENT (OUTPATIENT)
Dept: INTERNAL MEDICINE | Facility: CLINIC | Age: 77
End: 2022-04-25
Payer: MEDICARE

## 2022-04-25 VITALS
OXYGEN SATURATION: 98 % | WEIGHT: 220 LBS | DIASTOLIC BLOOD PRESSURE: 67 MMHG | BODY MASS INDEX: 34.53 KG/M2 | HEART RATE: 62 BPM | TEMPERATURE: 97.7 F | SYSTOLIC BLOOD PRESSURE: 135 MMHG | HEIGHT: 67 IN

## 2022-04-25 DIAGNOSIS — M54.9 DORSALGIA, UNSPECIFIED: ICD-10-CM

## 2022-04-25 DIAGNOSIS — M25.519 PAIN IN UNSPECIFIED SHOULDER: ICD-10-CM

## 2022-04-25 PROCEDURE — G0447 BEHAVIOR COUNSEL OBESITY 15M: CPT

## 2022-04-25 PROCEDURE — 99215 OFFICE O/P EST HI 40 MIN: CPT

## 2022-04-25 NOTE — ASSESSMENT
[FreeTextEntry1] : \par Diabetes: check A1c today, cont diet modification, weight loss efforts\par \par Obesity counselling: 15 mins, assessed BMI at 30 and above now in obese range; advised weight loss, exercise routine and diet; patient agreed to start exercise program for target weight loss 20 lbs; assisted patient with resources including nutrition referral as needed and arranged for follow-up to monitor weight loss progress over next several months\par

## 2022-04-25 NOTE — HISTORY OF PRESENT ILLNESS
[FreeTextEntry1] : Follow-up for elevated A1c and cholesterol check. [de-identified] : \par Taking Metformin every other day, reports good sugar control. Watching diet too. Has had some bilateral finger paresthesias but just in the mornings after sleeping with his hands under the pillow. This resolves when he shakes the fingers about for a few seconds.

## 2022-04-26 LAB
ALBUMIN SERPL ELPH-MCNC: 4.5 G/DL
ALP BLD-CCNC: 52 U/L
ALT SERPL-CCNC: 18 U/L
ANION GAP SERPL CALC-SCNC: 13 MMOL/L
AST SERPL-CCNC: 19 U/L
BILIRUB SERPL-MCNC: 1.1 MG/DL
BUN SERPL-MCNC: 17 MG/DL
CALCIUM SERPL-MCNC: 9.3 MG/DL
CHLORIDE SERPL-SCNC: 101 MMOL/L
CHOLEST SERPL-MCNC: 157 MG/DL
CO2 SERPL-SCNC: 25 MMOL/L
COVID-19 NUCLEOCAPSID  GAM ANTIBODY INTERPRETATION: NEGATIVE
COVID-19 SPIKE DOMAIN ANTIBODY INTERPRETATION: POSITIVE
CREAT SERPL-MCNC: 0.71 MG/DL
EGFR: 94 ML/MIN/1.73M2
ESTIMATED AVERAGE GLUCOSE: 134 MG/DL
GLUCOSE SERPL-MCNC: 120 MG/DL
HBA1C MFR BLD HPLC: 6.3 %
HDLC SERPL-MCNC: 50 MG/DL
LDLC SERPL CALC-MCNC: 97 MG/DL
NONHDLC SERPL-MCNC: 107 MG/DL
POTASSIUM SERPL-SCNC: 4.6 MMOL/L
PROT SERPL-MCNC: 7.1 G/DL
SARS-COV-2 AB SERPL IA-ACNC: >250 U/ML
SARS-COV-2 AB SERPL QL IA: 0.09 INDEX
SODIUM SERPL-SCNC: 139 MMOL/L
TRIGL SERPL-MCNC: 50 MG/DL

## 2022-06-09 ENCOUNTER — APPOINTMENT (OUTPATIENT)
Dept: CARDIOLOGY | Facility: CLINIC | Age: 77
End: 2022-06-09
Payer: COMMERCIAL

## 2022-06-09 VITALS — HEART RATE: 67 BPM | OXYGEN SATURATION: 98 % | SYSTOLIC BLOOD PRESSURE: 128 MMHG | DIASTOLIC BLOOD PRESSURE: 68 MMHG

## 2022-06-09 PROCEDURE — 93000 ELECTROCARDIOGRAM COMPLETE: CPT

## 2022-06-09 PROCEDURE — 99214 OFFICE O/P EST MOD 30 MIN: CPT

## 2022-06-13 ENCOUNTER — NON-APPOINTMENT (OUTPATIENT)
Age: 77
End: 2022-06-13

## 2022-06-13 NOTE — CARDIOLOGY SUMMARY
[de-identified] : 6/9/22\par Sinus  Rhythm \par -Right bundle branch block with left axis -bifascicular block. \par \par ABNORMAL \par

## 2022-06-13 NOTE — HISTORY OF PRESENT ILLNESS
[FreeTextEntry1] : Bryon is returning for a follow-up.  \par  \par No chest pains - no angina\par BP acceptable\par Taking lipid therapy - no side effects\par No LE edema\par Has not lost weight\par No very active\par \par

## 2022-06-13 NOTE — DISCUSSION/SUMMARY
[FreeTextEntry1] : Bryon Middleton is a 78 y/o with HTN and Hchol  - cath with severe small vessel CAD\par \par Cont with lipitor\par No angina\par BP acceptable\par Cont with present meds\par Stress test to assess for disease progression and for severe ischemia prior to traveling

## 2022-06-21 ENCOUNTER — APPOINTMENT (OUTPATIENT)
Dept: CV DIAGNOSTICS | Facility: HOSPITAL | Age: 77
End: 2022-06-21

## 2022-06-21 ENCOUNTER — OUTPATIENT (OUTPATIENT)
Dept: OUTPATIENT SERVICES | Facility: HOSPITAL | Age: 77
LOS: 1 days | End: 2022-06-21
Payer: MEDICARE

## 2022-06-21 DIAGNOSIS — I25.10 ATHEROSCLEROTIC HEART DISEASE OF NATIVE CORONARY ARTERY WITHOUT ANGINA PECTORIS: ICD-10-CM

## 2022-06-21 PROCEDURE — A9500: CPT

## 2022-06-21 PROCEDURE — 93017 CV STRESS TEST TRACING ONLY: CPT

## 2022-06-21 PROCEDURE — 78452 HT MUSCLE IMAGE SPECT MULT: CPT | Mod: 26,MH

## 2022-06-21 PROCEDURE — 78452 HT MUSCLE IMAGE SPECT MULT: CPT | Mod: MH

## 2022-06-21 PROCEDURE — 93018 CV STRESS TEST I&R ONLY: CPT

## 2022-06-21 PROCEDURE — 93016 CV STRESS TEST SUPVJ ONLY: CPT

## 2022-11-15 ENCOUNTER — APPOINTMENT (OUTPATIENT)
Dept: INTERNAL MEDICINE | Facility: CLINIC | Age: 77
End: 2022-11-15

## 2022-11-15 VITALS
BODY MASS INDEX: 34.06 KG/M2 | DIASTOLIC BLOOD PRESSURE: 68 MMHG | TEMPERATURE: 97.3 F | WEIGHT: 217 LBS | HEIGHT: 67 IN | SYSTOLIC BLOOD PRESSURE: 140 MMHG | OXYGEN SATURATION: 97 % | HEART RATE: 81 BPM

## 2022-11-15 PROCEDURE — G0008: CPT | Mod: 59

## 2022-11-15 PROCEDURE — G0447 BEHAVIOR COUNSEL OBESITY 15M: CPT

## 2022-11-15 PROCEDURE — 90662 IIV NO PRSV INCREASED AG IM: CPT | Mod: 59

## 2022-11-15 PROCEDURE — G0439: CPT

## 2022-11-15 PROCEDURE — G0444 DEPRESSION SCREEN ANNUAL: CPT | Mod: 59

## 2022-11-15 PROCEDURE — G0442 ANNUAL ALCOHOL SCREEN 15 MIN: CPT | Mod: NC,59

## 2022-11-15 NOTE — HEALTH RISK ASSESSMENT
[Good] : ~his/her~  mood as  good [Never] : Never [1 or 2 (0 pts)] : 1 or 2 (0 points) [Never (0 pts)] : Never (0 points) [No] : In the past 12 months have you used drugs other than those required for medical reasons? No [No falls in past year] : Patient reported no falls in the past year [0] : 2) Feeling down, depressed, or hopeless: Not at all (0) [PHQ-2 Negative - No further assessment needed] : PHQ-2 Negative - No further assessment needed [Audit-CScore] : 0 [[id number]] : 0 [Change in mental status noted] : No change in mental status noted [Language] : denies difficulty with language [Behavior] : denies difficulty with behavior [Learning/Retaining New Information] : denies difficulty learning/retaining new information [Handling Complex Tasks] : denies difficulty handling complex tasks [Reasoning] : denies difficulty with reasoning [Spatial Ability and Orientation] : denies difficulty with spatial ability and orientation [None] : None [With Family] : lives with family [Retired] : retired [High Risk Behavior] : no high risk behavior [Feels Safe at Home] : Feels safe at home [Fully functional (bathing, dressing, toileting, transferring, walking, feeding)] : Fully functional (bathing, dressing, toileting, transferring, walking, feeding) [Fully functional (using the telephone, shopping, preparing meals, housekeeping, doing laundry, using] : Fully functional and needs no help or supervision to perform IADLs (using the telephone, shopping, preparing meals, housekeeping, doing laundry, using transportation, managing medications and managing finances) [Reports changes in hearing] : Reports no changes in hearing [Reports changes in vision] : Reports no changes in vision [Reports normal functional visual acuity (ie: able to read med bottle)] : Reports normal functional visual acuity [Reports changes in dental health] : Reports no changes in dental health [Smoke Detector] : smoke detector [Carbon Monoxide Detector] : carbon monoxide detector [Guns at Home] : no guns at home [Safety elements used in home] : safety elements used in home [Seat Belt] :  uses seat belt [Sunscreen] : uses sunscreen [Travel to Developing Areas] : does not  travel to developing areas [TB Exposure] : is not being exposed to tuberculosis [Caregiver Concerns] : does not have caregiver concerns [Reviewed no changes] : Reviewed, no changes [AdvancecareDate] : 12/21

## 2022-11-15 NOTE — HISTORY OF PRESENT ILLNESS
[FreeTextEntry1] : Here for preventive visit. [de-identified] : \par Preventive visit: patient is up to date with vaccines; he is up to date with colonoscopy screening and is due for prostate cancer screening via [facility] and PSA testing today; he does not smoke cigarettes and does not misuse alcohol; he feels safe at home and has smoke/CO detectors in the house; he wears seatbelts when in vehicles\par

## 2022-11-22 LAB
ALBUMIN SERPL ELPH-MCNC: 4.5 G/DL
ALP BLD-CCNC: 53 U/L
ALT SERPL-CCNC: 15 U/L
ANION GAP SERPL CALC-SCNC: 10 MMOL/L
AST SERPL-CCNC: 20 U/L
BASOPHILS # BLD AUTO: 0.04 K/UL
BASOPHILS NFR BLD AUTO: 0.6 %
BILIRUB SERPL-MCNC: 0.7 MG/DL
BUN SERPL-MCNC: 11 MG/DL
CALCIUM SERPL-MCNC: 9.7 MG/DL
CHLORIDE SERPL-SCNC: 100 MMOL/L
CHOLEST SERPL-MCNC: 126 MG/DL
CO2 SERPL-SCNC: 27 MMOL/L
COVID-19 NUCLEOCAPSID  GAM ANTIBODY INTERPRETATION: NEGATIVE
COVID-19 SPIKE DOMAIN ANTIBODY INTERPRETATION: POSITIVE
CREAT SERPL-MCNC: 0.71 MG/DL
CREAT SPEC-SCNC: 129 MG/DL
EGFR: 94 ML/MIN/1.73M2
EOSINOPHIL # BLD AUTO: 0.18 K/UL
EOSINOPHIL NFR BLD AUTO: 2.7 %
ESTIMATED AVERAGE GLUCOSE: 137 MG/DL
GLUCOSE SERPL-MCNC: 101 MG/DL
HBA1C MFR BLD HPLC: 6.4 %
HCT VFR BLD CALC: 40.6 %
HDLC SERPL-MCNC: 50 MG/DL
HGB BLD-MCNC: 13.3 G/DL
IMM GRANULOCYTES NFR BLD AUTO: 0.5 %
LDLC SERPL CALC-MCNC: 68 MG/DL
LYMPHOCYTES # BLD AUTO: 1.67 K/UL
LYMPHOCYTES NFR BLD AUTO: 25.2 %
MAN DIFF?: NORMAL
MCHC RBC-ENTMCNC: 30 PG
MCHC RBC-ENTMCNC: 32.8 GM/DL
MCV RBC AUTO: 91.6 FL
MICROALBUMIN 24H UR DL<=1MG/L-MCNC: 1.8 MG/DL
MICROALBUMIN/CREAT 24H UR-RTO: 14 MG/G
MONOCYTES # BLD AUTO: 0.63 K/UL
MONOCYTES NFR BLD AUTO: 9.5 %
NEUTROPHILS # BLD AUTO: 4.08 K/UL
NEUTROPHILS NFR BLD AUTO: 61.5 %
NONHDLC SERPL-MCNC: 76 MG/DL
PLATELET # BLD AUTO: 206 K/UL
POTASSIUM SERPL-SCNC: 4.3 MMOL/L
PROT SERPL-MCNC: 7.5 G/DL
PSA SERPL-MCNC: 0.97 NG/ML
RBC # BLD: 4.43 M/UL
RBC # FLD: 14.7 %
SARS-COV-2 AB SERPL IA-ACNC: >250 U/ML
SARS-COV-2 AB SERPL QL IA: 0.08 INDEX
SODIUM SERPL-SCNC: 138 MMOL/L
TRIGL SERPL-MCNC: 43 MG/DL
TSH SERPL-ACNC: 2.56 UIU/ML
WBC # FLD AUTO: 6.63 K/UL

## 2022-12-20 ENCOUNTER — APPOINTMENT (OUTPATIENT)
Dept: CARDIOLOGY | Facility: CLINIC | Age: 77
End: 2022-12-20

## 2022-12-20 ENCOUNTER — NON-APPOINTMENT (OUTPATIENT)
Age: 77
End: 2022-12-20

## 2022-12-20 VITALS
OXYGEN SATURATION: 99 % | DIASTOLIC BLOOD PRESSURE: 69 MMHG | SYSTOLIC BLOOD PRESSURE: 134 MMHG | WEIGHT: 212 LBS | HEART RATE: 58 BPM | BODY MASS INDEX: 33.27 KG/M2 | HEIGHT: 67 IN

## 2022-12-20 PROCEDURE — 99214 OFFICE O/P EST MOD 30 MIN: CPT | Mod: 25

## 2022-12-20 PROCEDURE — 93000 ELECTROCARDIOGRAM COMPLETE: CPT

## 2022-12-21 NOTE — HISTORY OF PRESENT ILLNESS
[FreeTextEntry1] : Bryon is returning for a follow-up.  \par  \par No chest pains - no angina\par BP acceptable\par Taking lipid therapy - no side effects\par No LE edema\par Remains inactive mostly\par \par

## 2022-12-21 NOTE — CARDIOLOGY SUMMARY
[de-identified] : 12/20/22\par Sinus  Rhythm \par -Right bundle branch block with left axis -bifascicular block. \par \par ABNORMAL \par

## 2023-05-05 RX ORDER — METFORMIN HYDROCHLORIDE 500 MG/1
500 TABLET, COATED ORAL
Qty: 90 | Refills: 3 | Status: ACTIVE | COMMUNITY
Start: 2021-12-09 | End: 1900-01-01

## 2023-05-15 ENCOUNTER — APPOINTMENT (OUTPATIENT)
Dept: INTERNAL MEDICINE | Facility: CLINIC | Age: 78
End: 2023-05-15
Payer: MEDICARE

## 2023-05-15 VITALS — DIASTOLIC BLOOD PRESSURE: 68 MMHG | SYSTOLIC BLOOD PRESSURE: 128 MMHG

## 2023-05-15 VITALS
WEIGHT: 218 LBS | HEIGHT: 67 IN | OXYGEN SATURATION: 98 % | DIASTOLIC BLOOD PRESSURE: 65 MMHG | BODY MASS INDEX: 34.21 KG/M2 | TEMPERATURE: 97.4 F | HEART RATE: 72 BPM | SYSTOLIC BLOOD PRESSURE: 146 MMHG

## 2023-05-15 DIAGNOSIS — E11.9 TYPE 2 DIABETES MELLITUS W/OUT COMPLICATIONS: ICD-10-CM

## 2023-05-15 PROCEDURE — 99213 OFFICE O/P EST LOW 20 MIN: CPT

## 2023-05-15 NOTE — ASSESSMENT
[FreeTextEntry1] : \par Diabetes: check A1c today, cont diet modification, weight loss efforts\par \par HLD: cont statin, check FLP and LFTs today\par \par

## 2023-05-15 NOTE — HISTORY OF PRESENT ILLNESS
[FreeTextEntry1] : Follow-up for elevated A1c and cholesterol check. [de-identified] : \par Taking Metformin every other day, reports good sugar control. Watching diet too. Has had some bilateral finger paresthesias but just in the mornings after sleeping with his hands under the pillow. This resolves when he shakes the fingers about for a few seconds.

## 2023-05-16 ENCOUNTER — APPOINTMENT (OUTPATIENT)
Dept: CARDIOLOGY | Facility: CLINIC | Age: 78
End: 2023-05-16
Payer: MEDICARE

## 2023-05-16 ENCOUNTER — NON-APPOINTMENT (OUTPATIENT)
Age: 78
End: 2023-05-16

## 2023-05-16 VITALS
BODY MASS INDEX: 33.43 KG/M2 | HEART RATE: 72 BPM | SYSTOLIC BLOOD PRESSURE: 134 MMHG | HEIGHT: 67 IN | WEIGHT: 213 LBS | DIASTOLIC BLOOD PRESSURE: 78 MMHG | OXYGEN SATURATION: 96 %

## 2023-05-16 PROCEDURE — 93000 ELECTROCARDIOGRAM COMPLETE: CPT

## 2023-05-16 PROCEDURE — 99213 OFFICE O/P EST LOW 20 MIN: CPT | Mod: 25

## 2023-05-17 LAB
ALBUMIN SERPL ELPH-MCNC: 4.4 G/DL
ALP BLD-CCNC: 58 U/L
ALT SERPL-CCNC: 17 U/L
ANION GAP SERPL CALC-SCNC: 11 MMOL/L
AST SERPL-CCNC: 15 U/L
BASOPHILS # BLD AUTO: 0.06 K/UL
BASOPHILS NFR BLD AUTO: 0.9 %
BILIRUB SERPL-MCNC: 0.5 MG/DL
BUN SERPL-MCNC: 14 MG/DL
CALCIUM SERPL-MCNC: 9.5 MG/DL
CHLORIDE SERPL-SCNC: 101 MMOL/L
CHOLEST SERPL-MCNC: 118 MG/DL
CO2 SERPL-SCNC: 26 MMOL/L
CREAT SERPL-MCNC: 0.73 MG/DL
EGFR: 93 ML/MIN/1.73M2
EOSINOPHIL # BLD AUTO: 0.17 K/UL
EOSINOPHIL NFR BLD AUTO: 2.7 %
ESTIMATED AVERAGE GLUCOSE: 134 MG/DL
GLUCOSE SERPL-MCNC: 127 MG/DL
HBA1C MFR BLD HPLC: 6.3 %
HCT VFR BLD CALC: 41.4 %
HDLC SERPL-MCNC: 46 MG/DL
HGB BLD-MCNC: 13.4 G/DL
IMM GRANULOCYTES NFR BLD AUTO: 0.3 %
LDLC SERPL CALC-MCNC: 63 MG/DL
LYMPHOCYTES # BLD AUTO: 1.76 K/UL
LYMPHOCYTES NFR BLD AUTO: 27.8 %
MAN DIFF?: NORMAL
MCHC RBC-ENTMCNC: 29.7 PG
MCHC RBC-ENTMCNC: 32.4 GM/DL
MCV RBC AUTO: 91.8 FL
MONOCYTES # BLD AUTO: 0.67 K/UL
MONOCYTES NFR BLD AUTO: 10.6 %
NEUTROPHILS # BLD AUTO: 3.65 K/UL
NEUTROPHILS NFR BLD AUTO: 57.7 %
NONHDLC SERPL-MCNC: 72 MG/DL
PLATELET # BLD AUTO: 193 K/UL
POTASSIUM SERPL-SCNC: 4.7 MMOL/L
PROT SERPL-MCNC: 7.4 G/DL
RBC # BLD: 4.51 M/UL
RBC # FLD: 14.4 %
SODIUM SERPL-SCNC: 139 MMOL/L
TRIGL SERPL-MCNC: 43 MG/DL
TSH SERPL-ACNC: 2.15 UIU/ML
WBC # FLD AUTO: 6.33 K/UL

## 2023-05-19 RX ORDER — DAPAGLIFLOZIN 5 MG/1
5 TABLET, FILM COATED ORAL DAILY
Qty: 90 | Refills: 3 | Status: COMPLETED | COMMUNITY
Start: 2022-06-10 | End: 2023-05-19

## 2023-05-31 ENCOUNTER — APPOINTMENT (OUTPATIENT)
Dept: PULMONOLOGY | Facility: CLINIC | Age: 78
End: 2023-05-31
Payer: MEDICARE

## 2023-05-31 VITALS
BODY MASS INDEX: 33.59 KG/M2 | OXYGEN SATURATION: 98 % | HEART RATE: 69 BPM | TEMPERATURE: 97 F | SYSTOLIC BLOOD PRESSURE: 146 MMHG | WEIGHT: 214 LBS | HEIGHT: 67 IN | DIASTOLIC BLOOD PRESSURE: 72 MMHG | RESPIRATION RATE: 15 BRPM

## 2023-05-31 DIAGNOSIS — E66.9 OBESITY, UNSPECIFIED: ICD-10-CM

## 2023-05-31 PROCEDURE — 99214 OFFICE O/P EST MOD 30 MIN: CPT

## 2023-05-31 NOTE — PHYSICAL EXAM
[Normal Appearance] : normal appearance [General Appearance - In No Acute Distress] : no acute distress [Normal Conjunctiva] : the conjunctiva exhibited no abnormalities [Low Lying Soft Palate] : low lying soft palate [IV] : IV [Neck Appearance] : the appearance of the neck was normal [Heart Sounds] : normal S1 and S2 [Heart Rate And Rhythm] : heart rate was normal and rhythm regular [Murmurs] : no murmurs [] : no respiratory distress [Respiration, Rhythm And Depth] : normal respiratory rhythm and effort [Exaggerated Use Of Accessory Muscles For Inspiration] : no accessory muscle use [Auscultation Breath Sounds / Voice Sounds] : lungs were clear to auscultation bilaterally [Involuntary Movements] : no involuntary movements were seen [Cyanosis, Localized] : no localized cyanosis [No Focal Deficits] : no focal deficits [Oriented To Time, Place, And Person] : oriented to person, place, and time [Impaired Insight] : insight and judgment were intact [Affect] : the affect was normal [Mood] : the mood was normal

## 2023-05-31 NOTE — HISTORY OF PRESENT ILLNESS
[CPAP: ___ cmH2O] : CPAP: [unfilled] cmH2O [FreeTextEntry1] : 77 y/o M with severe KAIDEN--AHI 38.8/hr, on APAP, with comorbid HTN, CAD, HLD, BPH, ED, Obesity Class I here for a follow-up. \par \par Split Sleep Study (11/23/2016) from an outside sleep center showed an AHI of 38.8/hr with a T 90 of 13 %, and optimal bilevel pressures of 14/10 cmH20. The patient was intolerant to Bilevel and he has been treated with APAP therapy since 2017. With regular use of  APAP for the duration of his sleep, he reports continued improvement in his sleep quality with resolution to snoring. He denies AM headaches, unintentional sleep episodes during his wake hours, and drowsy driving. He sleeps 6-8 hours / night and feels refreshed in the mornings. He naps once a day  around 5 or 6 pm for 30-minutes if he is relatively inactive at the time. However, his wife feels that he dozes off for a few seconds if he is inactive. He uses a nasal pillows mask which he finds comfortable but reports morning dry mouth. Patient and spouse denies nocturnal mouth breathing. The pressures are well tolerated. He received the recall replacement DS 2 APAP device from [employer] about a month ago and has been using this device since. \par \par The patient's wife is requesting a letter to travel with APAP as they are going away on vacation for 3-months, returning in September. \par \par Lost a few lbs, about 6 lbs, through dietary modification, gardening, and walking. No interim changes to his health reported.  [Nocturnal Oxygen] : The patient does not use nocturnal oxygen [ESS] : 2

## 2023-05-31 NOTE — REVIEW OF SYSTEMS
[A.M. Dry Mouth] : a.m. dry mouth [Obesity] : obesity [Diabetes] : diabetes  [Arthralgias] : arthralgias [Unintentional Sleep while inactive] : unintentional sleep while inactive [Awakes With Dry Mouth] : awakes with dry mouth [Recent Wt Loss (___ Lbs)] : recent [unfilled] ~Ulb weight loss [Fatigue] : no fatigue [Recent Wt Gain (___ Lbs)] : no recent weight gain [Nasal Congestion] : no nasal congestion [Postnasal Drip] : no postnasal drip [Chest Pain] : no chest pain [Palpitations] : no palpitations [CHF] : no congestive heart failure [Thyroid Disease] : no thyroid disease [History of Iron Deficiency] : no history of iron deficiency [A.M. Headache] : no headache present upon awakening [Heartburn] : no heartburn [Nocturia] : no nocturia [Depression] : no depression [Anxious] : not anxious [Snoring] : no snoring [Witnessed Apneas] : demonstrated no ~M apnea [Frequent Nocturnal Awakenings] : no nocturnal awakenings from sleep [Daytime Somnolence: ESS=____] : no daytime somnolence [Unintentional Sleep while active] : no unintentional sleep while active [Awakes Unrefreshed] : restorative sleep [Awakes With Headache] : awakes without a headache [Difficulty Initiating Sleep] : no difficulty falling asleep [Difficulty Maintaining Sleep] : no difficulty maintaining sleep [Irresistible urge to move legs] : no irresistible urge to move legs because of lower extremity discomfort [LE discomfort relieved by movement] : lower extremity discomfort not relieved by movement [Sleep Disturbances due to LE symptoms] : ~T no sleep disturbances due to lower extremity symptoms [Late day/ Evening symptoms] : no late day/evening symptoms [Unusual Sleep Behavior] : no unusual sleep behavior [Hypersomnolence] : not sleeping much more than usual [Cataplexy] :  no cataplexy [Hypnogogic Hallucinations] : no hypnogogic hallucinations [Hypnopompic Hallucinations] : no hypnopompic hallucinations [Sleep Paralysis] : no sleep paralysis [FreeTextEntry9] : CAD, HTN [FreeTextEntry1] : 11 - 11:30 am  [FreeTextEntry3] : brief  [FreeTextEntry2] : 6-7 am [FreeTextEntry4] : once around 2-3 am to urinate  [FreeTextEntry5] : brief  [FreeTextEntry6] : 6-8 hours  [FreeTextEntry7] : once in the evening around 5 or 6 pm for 30-minutes if he is relatively inactive

## 2023-05-31 NOTE — ASSESSMENT
[FreeTextEntry1] : 74 y/o M with h/o severe KAIDEN (AHI 38.8/hr, diagnosed in 2016 via Split Sleep Study at an outside center), intolerant to Bilevel therapy, currently on APAP, with comorbid HTN, CAD, HLD, BPH, ED, Obesity Class I presents for a follow-up visit. \par \par Therapeutic and compliance data download reveals usage 96.7% of days with an average use of 6 hours and 30 minutes. Therapy based AHI is 8.8/hr on 4-20 cm H2O, median pressure of 9.5 cmH20;with insignificant mask leak.\par \par --The patient is experiencing benefit from APAP and will continue with nightly use, as prescribed.\par --Advised patient to increase humidifier level for AM dry mouth in the setting of no mask leak and patient is not a nocturnal mouth breather.\par --The patient is going away on vacation for 3-months, returning in September, and wants to order a new APAP through his insurance upon his return as he is eligible for a new device. Patient will call office at that time to order a new APAP. \par --Letter to travel with his DS 2APAP was provided to the patient, as requested. \par --Rx for renewal of pap supplies for DreamStation 2 APAP was placed to [employer], [facility]\par --Continue with weight loss methods. The patient verbalized understanding on obesity and the role of weight loss as it relates to KAIDEN. \par \par The ramifications of untreated KAIDEN and the importance of continued treatment were discussed with the patient.\par F/u in 6 months or sooner if needed.

## 2023-07-02 NOTE — CARDIOLOGY SUMMARY
[de-identified] : 5/16/23\par Sinus  Rhythm \par -Right bundle branch block with left axis -bifascicular block. \par \par ABNORMAL \par

## 2023-07-02 NOTE — DISCUSSION/SUMMARY
[EKG obtained to assist in diagnosis and management of assessed problem(s)] : EKG obtained to assist in diagnosis and management of assessed problem(s) [FreeTextEntry1] : Bryon Middleton is a 79 y/o with HTN and Hchol  - cath with severe small vessel CAD; Recent stress with mild ischemia\par \par Cont with lipitor\par No angina\par BP acceptable\par Cont with present meds

## 2023-07-02 NOTE — HISTORY OF PRESENT ILLNESS
[FreeTextEntry1] : Bryon is returning for a follow-up.  \par  \par No chest pains - no angina\par BP acceptable\par Taking lipid therapy - no side effects\par No LE edema \par \par

## 2023-10-24 ENCOUNTER — OFFICE (OUTPATIENT)
Dept: URBAN - METROPOLITAN AREA CLINIC 35 | Facility: CLINIC | Age: 78
Setting detail: OPHTHALMOLOGY
End: 2023-10-24
Payer: MEDICARE

## 2023-10-24 VITALS — HEIGHT: 60 IN

## 2023-10-24 DIAGNOSIS — H02.401: ICD-10-CM

## 2023-10-24 DIAGNOSIS — H35.89: ICD-10-CM

## 2023-10-24 DIAGNOSIS — H52.4: ICD-10-CM

## 2023-10-24 DIAGNOSIS — H02.34: ICD-10-CM

## 2023-10-24 DIAGNOSIS — H02.31: ICD-10-CM

## 2023-10-24 DIAGNOSIS — E11.9: ICD-10-CM

## 2023-10-24 DIAGNOSIS — D31.02: ICD-10-CM

## 2023-10-24 DIAGNOSIS — H11.153: ICD-10-CM

## 2023-10-24 DIAGNOSIS — H52.7: ICD-10-CM

## 2023-10-24 PROCEDURE — 92015 DETERMINE REFRACTIVE STATE: CPT | Performed by: OPHTHALMOLOGY

## 2023-10-24 PROCEDURE — 92250 FUNDUS PHOTOGRAPHY W/I&R: CPT | Performed by: OPHTHALMOLOGY

## 2023-10-24 PROCEDURE — 92014 COMPRE OPH EXAM EST PT 1/>: CPT | Performed by: OPHTHALMOLOGY

## 2023-10-24 ASSESSMENT — CONFRONTATIONAL VISUAL FIELD TEST (CVF)
OD_FINDINGS: FULL
OS_FINDINGS: FULL

## 2023-10-24 ASSESSMENT — REFRACTION_MANIFEST
OS_VA1: 20/20
OS_CYLINDER: +1.00
OD_AXIS: 120
OS_VA1: 20/30-
OD_AXIS: 124
OD_CYLINDER: +0.50
OS_AXIS: 062
OD_CYLINDER: +0.75
OD_VA1: 20/25-
OD_AXIS: 123
OD_SPHERE: -0.25
OS_CYLINDER: 0.00
OS_SPHERE: PLANO
OD_VA1: 20/30+
OD_SPHERE: +3.50
OS_AXIS: 053
OS_ADD: +3.25
OS_SPHERE: +2.75
OD_ADD: +3.25
OS_AXIS: 040
OS_SPHERE: +1.00
OS_CYLINDER: +1.25
OD_SPHERE: +0.75
OD_CYLINDER: +0.50

## 2023-10-24 ASSESSMENT — KERATOMETRY
OD_K2POWER_DIOPTERS: 42.75
OD_K1POWER_DIOPTERS: 41.75
OD_AXISANGLE_DEGREES: 133
OS_K2POWER_DIOPTERS: 43.00
OS_AXISANGLE_DEGREES: 058
OS_K1POWER_DIOPTERS: 41.75

## 2023-10-24 ASSESSMENT — REFRACTION_CURRENTRX
OD_AXIS: 124
OD_OVR_VA: 20/
OS_VPRISM_DIRECTION: SV
OD_VPRISM_DIRECTION: SV
OS_AXIS: 053
OS_OVR_VA: 20/
OS_SPHERE: +1.00
OD_SPHERE: +0.75
OS_CYLINDER: +1.00
OD_CYLINDER: +0.50
OD_OVR_VA: 20/
OS_OVR_VA: 20/

## 2023-10-24 ASSESSMENT — AXIALLENGTH_DERIVED
OS_AL: 23.8124
OS_AL: 23.6155
OS_AL: 22.7234
OD_AL: 23.9593
OD_AL: 24.0096
OD_AL: 23.6616
OD_AL: 22.6308

## 2023-10-24 ASSESSMENT — SPHEQUIV_DERIVED
OS_SPHEQUIV: 1
OS_SPHEQUIV: 0.5
OS_SPHEQUIV: 3.375
OD_SPHEQUIV: 0.25
OD_SPHEQUIV: 3.75
OD_SPHEQUIV: 0.125
OD_SPHEQUIV: 1

## 2023-10-24 ASSESSMENT — REFRACTION_AUTOREFRACTION
OD_AXIS: 135
OS_SPHERE: -0.25
OD_CYLINDER: +1.00
OD_SPHERE: -0.25
OS_CYLINDER: +1.50
OS_AXIS: 045

## 2023-10-24 ASSESSMENT — VISUAL ACUITY
OD_BCVA: 20/30
OS_BCVA: 20/30

## 2023-10-24 ASSESSMENT — TONOMETRY
OS_IOP_MMHG: 20
OD_IOP_MMHG: 19

## 2023-10-24 ASSESSMENT — LID POSITION - COMMENTS
OD_COMMENTS: BLEPHAROCHALASIS
OS_COMMENTS: BLEPHAROCHALASIS

## 2023-10-24 ASSESSMENT — LID POSITION - PTOSIS: OD_PTOSIS: RUL

## 2023-11-28 ENCOUNTER — APPOINTMENT (OUTPATIENT)
Dept: CARDIOLOGY | Facility: CLINIC | Age: 78
End: 2023-11-28
Payer: MEDICARE

## 2023-11-28 VITALS
SYSTOLIC BLOOD PRESSURE: 122 MMHG | BODY MASS INDEX: 32.49 KG/M2 | DIASTOLIC BLOOD PRESSURE: 64 MMHG | HEIGHT: 67 IN | WEIGHT: 207 LBS | OXYGEN SATURATION: 99 % | HEART RATE: 61 BPM

## 2023-11-28 DIAGNOSIS — I25.10 ATHEROSCLEROTIC HEART DISEASE OF NATIVE CORONARY ARTERY W/OUT ANGINA PECTORIS: ICD-10-CM

## 2023-11-28 DIAGNOSIS — E78.5 HYPERLIPIDEMIA, UNSPECIFIED: ICD-10-CM

## 2023-11-28 DIAGNOSIS — I10 ESSENTIAL (PRIMARY) HYPERTENSION: ICD-10-CM

## 2023-11-28 PROCEDURE — 93000 ELECTROCARDIOGRAM COMPLETE: CPT

## 2023-11-28 PROCEDURE — 99214 OFFICE O/P EST MOD 30 MIN: CPT | Mod: 25

## 2024-01-29 PROBLEM — Z13.31 DEPRESSION SCREENING: Status: RESOLVED | Noted: 2021-12-13 | Resolved: 2024-01-29

## 2024-01-29 PROBLEM — E66.9 OBESITY (BMI 30-39.9): Status: RESOLVED | Noted: 2021-12-13 | Resolved: 2024-01-29

## 2024-01-29 PROBLEM — Z13.39 ALCOHOL SCREENING: Status: RESOLVED | Noted: 2021-12-13 | Resolved: 2024-01-29

## 2024-02-07 ENCOUNTER — APPOINTMENT (OUTPATIENT)
Dept: INTERNAL MEDICINE | Facility: CLINIC | Age: 79
End: 2024-02-07
Payer: MEDICARE

## 2024-02-07 ENCOUNTER — OUTPATIENT (OUTPATIENT)
Dept: OUTPATIENT SERVICES | Facility: HOSPITAL | Age: 79
LOS: 1 days | End: 2024-02-07
Payer: MEDICARE

## 2024-02-07 VITALS
BODY MASS INDEX: 33.2 KG/M2 | SYSTOLIC BLOOD PRESSURE: 122 MMHG | WEIGHT: 212 LBS | RESPIRATION RATE: 14 BRPM | DIASTOLIC BLOOD PRESSURE: 60 MMHG | HEART RATE: 60 BPM

## 2024-02-07 DIAGNOSIS — Z13.39 ENCOUNTER FOR SCREENING EXAM FOR OTHER MENTAL HEALTH AND BEHAVIORAL DISORDERS: ICD-10-CM

## 2024-02-07 DIAGNOSIS — I10 ESSENTIAL (PRIMARY) HYPERTENSION: ICD-10-CM

## 2024-02-07 DIAGNOSIS — R06.09 OTHER FORMS OF DYSPNEA: ICD-10-CM

## 2024-02-07 DIAGNOSIS — Z23 ENCOUNTER FOR IMMUNIZATION: ICD-10-CM

## 2024-02-07 DIAGNOSIS — Z13.31 ENCOUNTER FOR SCREENING FOR DEPRESSION: ICD-10-CM

## 2024-02-07 DIAGNOSIS — G47.33 OBSTRUCTIVE SLEEP APNEA (ADULT) (PEDIATRIC): ICD-10-CM

## 2024-02-07 DIAGNOSIS — Z00.00 ENCOUNTER FOR GENERAL ADULT MEDICAL EXAMINATION W/OUT ABNORMAL FINDINGS: ICD-10-CM

## 2024-02-07 DIAGNOSIS — E66.9 OBESITY, UNSPECIFIED: ICD-10-CM

## 2024-02-07 PROCEDURE — G0009: CPT

## 2024-02-07 PROCEDURE — 90677 PCV20 VACCINE IM: CPT

## 2024-02-07 PROCEDURE — 99397 PER PM REEVAL EST PAT 65+ YR: CPT

## 2024-02-07 PROCEDURE — G0439: CPT

## 2024-02-07 NOTE — HEALTH RISK ASSESSMENT
[Excellent] : ~his/her~  mood as  excellent [Yes] : Yes [Monthly or less (1 pt)] : Monthly or less (1 point) [1 or 2 (0 pts)] : 1 or 2 (0 points) [Never (0 pts)] : Never (0 points) [No] : In the past 12 months have you used drugs other than those required for medical reasons? No [No falls in past year] : Patient reported no falls in the past year [0] : 2) Feeling down, depressed, or hopeless: Not at all (0) [PHQ-2 Negative - No further assessment needed] : PHQ-2 Negative - No further assessment needed [None] : None [Feels Safe at Home] : Feels safe at home [Neglect Or Abandonment] : neglect or abandonment [Fully functional (bathing, dressing, toileting, transferring, walking, feeding)] : Fully functional (bathing, dressing, toileting, transferring, walking, feeding) [Fully functional (using the telephone, shopping, preparing meals, housekeeping, doing laundry, using] : Fully functional and needs no help or supervision to perform IADLs (using the telephone, shopping, preparing meals, housekeeping, doing laundry, using transportation, managing medications and managing finances) [Reports normal functional visual acuity (ie: able to read med bottle)] : Reports normal functional visual acuity [Smoke Detector] : smoke detector [Seat Belt] :  uses seat belt [Never] : Never [Audit-CScore] : 1 [[id number]] : 0 [Change in mental status noted] : No change in mental status noted [Language] : denies difficulty with language [Behavior] : denies difficulty with behavior [Learning/Retaining New Information] : denies difficulty learning/retaining new information [Handling Complex Tasks] : denies difficulty handling complex tasks [Reasoning] : denies difficulty with reasoning [Spatial Ability and Orientation] : denies difficulty with spatial ability and orientation [Reports changes in hearing] : Reports no changes in hearing [Reports changes in vision] : Reports no changes in vision [Reports changes in dental health] : Reports no changes in dental health

## 2024-02-07 NOTE — HISTORY OF PRESENT ILLNESS
[FreeTextEntry1] : Mr. PERAZA is here for an annual physical examination and assessment. [de-identified] : He generally feels well with no specific complaints. His recent health has been good.  Denies headache, dizziness. Denies rash, fatigue, fever, weight loss, anorexia. Denies cough, wheezing. Denies CP, SOB, LAKHANI, edema, palpitations. Denies abdominal pain, N/V/D/C. Denies BRBPR, melena, dysphagia. Denies dysuria, frequency, hematuria, hesitancy. Denies weakness, numbness, gait instability.

## 2024-02-08 LAB
25(OH)D3 SERPL-MCNC: 44.5 NG/ML
ALBUMIN SERPL ELPH-MCNC: 4.7 G/DL
ALP BLD-CCNC: 52 U/L
ALT SERPL-CCNC: 33 U/L
ANION GAP SERPL CALC-SCNC: 14 MMOL/L
AST SERPL-CCNC: 26 U/L
BASOPHILS # BLD AUTO: 0.04 K/UL
BASOPHILS NFR BLD AUTO: 0.7 %
BILIRUB SERPL-MCNC: 1 MG/DL
BUN SERPL-MCNC: 13 MG/DL
CALCIUM SERPL-MCNC: 9.6 MG/DL
CHLORIDE SERPL-SCNC: 100 MMOL/L
CHOLEST SERPL-MCNC: 128 MG/DL
CO2 SERPL-SCNC: 26 MMOL/L
CREAT SERPL-MCNC: 0.71 MG/DL
EGFR: 93 ML/MIN/1.73M2
EOSINOPHIL # BLD AUTO: 0.17 K/UL
EOSINOPHIL NFR BLD AUTO: 3.1 %
ESTIMATED AVERAGE GLUCOSE: 126 MG/DL
GLUCOSE SERPL-MCNC: 108 MG/DL
HBA1C MFR BLD HPLC: 6 %
HCT VFR BLD CALC: 39.4 %
HDLC SERPL-MCNC: 50 MG/DL
HGB BLD-MCNC: 12.8 G/DL
IMM GRANULOCYTES NFR BLD AUTO: 0.4 %
LDLC SERPL CALC-MCNC: 66 MG/DL
LYMPHOCYTES # BLD AUTO: 1.7 K/UL
LYMPHOCYTES NFR BLD AUTO: 31.3 %
MAN DIFF?: NORMAL
MCHC RBC-ENTMCNC: 29.7 PG
MCHC RBC-ENTMCNC: 32.5 GM/DL
MCV RBC AUTO: 91.4 FL
MONOCYTES # BLD AUTO: 0.62 K/UL
MONOCYTES NFR BLD AUTO: 11.4 %
NEUTROPHILS # BLD AUTO: 2.89 K/UL
NEUTROPHILS NFR BLD AUTO: 53.1 %
NONHDLC SERPL-MCNC: 78 MG/DL
PLATELET # BLD AUTO: 190 K/UL
POTASSIUM SERPL-SCNC: 4.3 MMOL/L
PROT SERPL-MCNC: 7.8 G/DL
PSA SERPL-MCNC: 0.96 NG/ML
RBC # BLD: 4.31 M/UL
RBC # FLD: 14.8 %
SODIUM SERPL-SCNC: 140 MMOL/L
TRIGL SERPL-MCNC: 50 MG/DL
WBC # FLD AUTO: 5.44 K/UL

## 2024-02-18 DIAGNOSIS — Z00.00 ENCOUNTER FOR GENERAL ADULT MEDICAL EXAMINATION WITHOUT ABNORMAL FINDINGS: ICD-10-CM

## 2024-02-18 DIAGNOSIS — Z23 ENCOUNTER FOR IMMUNIZATION: ICD-10-CM

## 2024-03-06 ENCOUNTER — RX RENEWAL (OUTPATIENT)
Age: 79
End: 2024-03-06

## 2024-03-06 RX ORDER — DOXAZOSIN 4 MG/1
4 TABLET ORAL DAILY
Qty: 90 | Refills: 1 | Status: ACTIVE | COMMUNITY
Start: 1900-01-01 | End: 1900-01-01

## 2024-03-21 RX ORDER — SILDENAFIL 100 MG/1
100 TABLET, FILM COATED ORAL
Qty: 32 | Refills: 3 | Status: ACTIVE | COMMUNITY
Start: 2018-12-01

## 2024-04-02 ENCOUNTER — APPOINTMENT (OUTPATIENT)
Dept: PULMONOLOGY | Facility: CLINIC | Age: 79
End: 2024-04-02
Payer: COMMERCIAL

## 2024-04-02 PROCEDURE — 94660 CPAP INITIATION&MGMT: CPT

## 2024-04-02 NOTE — PROCEDURE
[FreeTextEntry1] : Maskfitting Patient and his wife claim the machine does not work, turns off at night,  and makes a gurgling sound.  The machine is set to APAP mode from 4-20 cm H2O.  It does turn on and was working when I tried it in the office.  The patient said his cheeks puff from the air pressure.  It is possible this happens when his mouth opens during the night, because he mentioned having a dry mouth. Patient is currently using a ResMed P10 nasal pillow mask, size Medium.  A large would be a better fit but he said he found the medium to be more comfortable. Patient was fit with a [employer] full face mask, size Large.  His wife claims he will never wear it.  ROLANDO PERAZA was comfortable with the fit. Patient will try this mask to see if it makes any difference with how he feels with it.  He will follow up with us within 2 weeks.

## 2024-04-03 DIAGNOSIS — M17.0 BILATERAL PRIMARY OSTEOARTHRITIS OF KNEE: ICD-10-CM

## 2024-04-04 ENCOUNTER — APPOINTMENT (OUTPATIENT)
Dept: ORTHOPEDIC SURGERY | Facility: CLINIC | Age: 79
End: 2024-04-04
Payer: MEDICARE

## 2024-04-04 VITALS
HEART RATE: 78 BPM | HEIGHT: 66 IN | BODY MASS INDEX: 33.43 KG/M2 | WEIGHT: 208 LBS | DIASTOLIC BLOOD PRESSURE: 69 MMHG | SYSTOLIC BLOOD PRESSURE: 136 MMHG

## 2024-04-04 PROCEDURE — 72170 X-RAY EXAM OF PELVIS: CPT

## 2024-04-04 PROCEDURE — 72100 X-RAY EXAM L-S SPINE 2/3 VWS: CPT

## 2024-04-04 PROCEDURE — 99204 OFFICE O/P NEW MOD 45 MIN: CPT

## 2024-04-05 ENCOUNTER — APPOINTMENT (OUTPATIENT)
Dept: INTERNAL MEDICINE | Facility: CLINIC | Age: 79
End: 2024-04-05
Payer: MEDICARE

## 2024-04-05 ENCOUNTER — OUTPATIENT (OUTPATIENT)
Dept: OUTPATIENT SERVICES | Facility: HOSPITAL | Age: 79
LOS: 1 days | End: 2024-04-05
Payer: MEDICARE

## 2024-04-05 DIAGNOSIS — B02.9 ZOSTER W/OUT COMPLICATIONS: ICD-10-CM

## 2024-04-05 DIAGNOSIS — I10 ESSENTIAL (PRIMARY) HYPERTENSION: ICD-10-CM

## 2024-04-05 DIAGNOSIS — H91.93 UNSPECIFIED HEARING LOSS, BILATERAL: ICD-10-CM

## 2024-04-05 PROCEDURE — 99213 OFFICE O/P EST LOW 20 MIN: CPT

## 2024-04-05 PROCEDURE — G0463: CPT

## 2024-04-07 PROBLEM — H91.93 BILATERAL HEARING LOSS: Status: ACTIVE | Noted: 2024-04-05

## 2024-04-07 NOTE — PHYSICAL EXAM
[Right Paraspinal ___ (level)] : ~Ulevel [unfilled] right paraspinal [SLR] : negative Straight Leg Raise [de-identified] : impacted cerumen BL

## 2024-04-07 NOTE — HISTORY OF PRESENT ILLNESS
[FreeTextEntry8] : Comes in for managment of LBP. 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 Some pain into RLE but no weakness or sensory changes.  Also having some trouble hearing.

## 2024-04-12 ENCOUNTER — APPOINTMENT (OUTPATIENT)
Dept: PULMONOLOGY | Facility: CLINIC | Age: 79
End: 2024-04-12
Payer: MEDICARE

## 2024-04-12 VITALS
WEIGHT: 206 LBS | HEART RATE: 78 BPM | BODY MASS INDEX: 33.11 KG/M2 | RESPIRATION RATE: 15 BRPM | DIASTOLIC BLOOD PRESSURE: 71 MMHG | TEMPERATURE: 98 F | HEIGHT: 66 IN | OXYGEN SATURATION: 97 % | SYSTOLIC BLOOD PRESSURE: 137 MMHG

## 2024-04-12 DIAGNOSIS — G47.33 OBSTRUCTIVE SLEEP APNEA (ADULT) (PEDIATRIC): ICD-10-CM

## 2024-04-12 PROCEDURE — 99214 OFFICE O/P EST MOD 30 MIN: CPT

## 2024-04-15 PROBLEM — G47.33 OBSTRUCTIVE SLEEP APNEA ON CPAP: Status: ACTIVE | Noted: 2017-05-05

## 2024-04-15 NOTE — HISTORY OF PRESENT ILLNESS
[To Bed: ___] : ~he/she~ goes to bed at [unfilled] [Arises: ___] : arises at [unfilled] [Sleep Onset Latency: ___ minutes] : sleep onset latency of [unfilled] minutes reported [Nocturnal Awakenings: ___] : ~he/she~ typically has [unfilled] nocturnal awakenings [WASO: ___] : Wake time after sleep onset is [unfilled] [Daytime Sleep: ___] : daytime sleep: [unfilled] [FreeTextEntry1] : Mr. Middleton is 79-year-old male with severe KAIDEN--AHI 38.8/hr, on APAP therapy who now presents to the office for follow up.  PMHx: HTN, CAD, HLD, BPH, ED, Obesity Class I   DX: (2016) Split Sleep Study from an outside sleep center showed an AHI of 38.8/hr with a T 90 of 13 %, and optimal bilevel pressures of 14/10 cmH20.  TX: APAP 4-20 cmH2O.  DreamStation Auto APAP provided by [facility] [employer] on 2017.  Patient reports his Dream station device wasn't turning on for few weeks but is currently working. Wife is requesting a new sleep study; he has lost weight since last sleep study.  Patient states he is compliant with CPAP therapy and feels benefit.  Wife reports she does witnessed apneas occasionally and patient is taking frequent naps during the daytime. Patient reports he was prescribed a new CPAP machine but was informed by DME that patient has a co-pay.  Wife reports patient has excellent insurance, and he should not be paying co-pay. Patient endorses he lost 36 lbs since last sleep study. He reports occasional dry mouth in the morning.  EPWORTH SLEEPINESS SCALE How likely are you to doze off or fall asleep in the situations described below, in contrast to feeling just tired? This refers to your usual way of life in recent times. Even if you haven't done some of these things recently, try to work out how they would have affected you. Use the following scale to choose one most appropriate number for each situation.......Chance of dozin= never. 1= slight. 2= moderate. 3= high.  CHANCE OF DOZING............SITUATION 0.............................................Sitting and reading 3.............................................Watching TV 0.............................................Sitting inactive in a public place (eg a theatre or a meeting) 0.............................................As a passenger in a car for an hour without a break 3.............................................Lying down to rest in the afternoon when circumstances permit 0............................................Sitting and talking to someone 3.............................................Sitting quietly after lunch without alcohol 0.............................................In a car, while stopped for a few minutes in traffic  9............................................TOTAL ESS SCORE

## 2024-04-15 NOTE — ASSESSMENT
[FreeTextEntry1] : Mr. Middleton is 79-year-old male with severe KAIDEN--AHI 38.8/hr, on APAP therapy who now presents to the office for follow up.  PMHx: HTN, CAD, HLD, BPH, ED, Obesity Class I   Discussed with patient CPAP compliance data: Compliant 40% of days, 30% for >4-hrs, average 5hrs 17mins. AHI 6.9/hr Leaks minimal. Instructed patient to increase usage of CPAP therapy.  Given patient's weight loss since the last sleep study and recent daytime sleepiness will send the patient for a (SPLIT PSG/CPAP) at the [facility] sleep disorders center to evaluate for the presence of sleep disordered breathing.  Explained the rationale for diagnosis and treatment of sleep apnea including its effect on quality of life and long term cardiovascular risk. RX for new headgear sent to DME. Discussed with patient and wife, he had a co-pay for new device likely due to not having 100% DME coverage with his current insurance.  Patient would like to wait until sleep study is complete to reorder new device. PAP resupply to continue therapy at the current settings.  Above discussed at length, all questions answered, he appears to understand and will call for results 1 week after completion of the study.

## 2024-04-15 NOTE — REVIEW OF SYSTEMS
[EDS: ESS=____] : daytime somnolence: ESS=[unfilled] [Recent Wt Loss (___ Lbs)] : recent [unfilled] ~Ulb weight loss [Witnessed Apneas] : witnessed apnea [A.M. Dry Mouth] : a.m. dry mouth [Obesity] : obesity [Negative] : Psychiatric [FreeTextEntry6] : back pain

## 2024-04-15 NOTE — PHYSICAL EXAM
[General Appearance - Well Developed] : well developed [Normal Appearance] : normal appearance [Normal Conjunctiva] : the conjunctiva exhibited no abnormalities [IV] : IV [Neck Appearance] : the appearance of the neck was normal [Heart Rate And Rhythm] : heart rate was normal and rhythm regular [Heart Sounds] : normal S1 and S2 [Respiration, Rhythm And Depth] : normal respiratory rhythm and effort [Auscultation Breath Sounds / Voice Sounds] : lungs were clear to auscultation bilaterally [Abnormal Walk] : normal gait [Skin Color & Pigmentation] : normal skin color and pigmentation [No Focal Deficits] : no focal deficits [Oriented To Time, Place, And Person] : oriented to person, place, and time [Mood] : the mood was normal

## 2024-04-16 DIAGNOSIS — H91.93 UNSPECIFIED HEARING LOSS, BILATERAL: ICD-10-CM

## 2024-04-16 DIAGNOSIS — M54.50 LOW BACK PAIN, UNSPECIFIED: ICD-10-CM

## 2024-04-16 PROBLEM — B02.9 SHINGLES: Status: ACTIVE | Noted: 2024-04-16

## 2024-04-16 RX ORDER — VALACYCLOVIR 1 G/1
1 TABLET, FILM COATED ORAL
Qty: 14 | Refills: 0 | Status: ACTIVE | COMMUNITY
Start: 2024-04-16 | End: 1900-01-01

## 2024-04-19 ENCOUNTER — OUTPATIENT (OUTPATIENT)
Dept: OUTPATIENT SERVICES | Facility: HOSPITAL | Age: 79
LOS: 1 days | End: 2024-04-19
Payer: COMMERCIAL

## 2024-04-19 ENCOUNTER — APPOINTMENT (OUTPATIENT)
Dept: MRI IMAGING | Facility: CLINIC | Age: 79
End: 2024-04-19
Payer: COMMERCIAL

## 2024-04-19 DIAGNOSIS — M54.50 LOW BACK PAIN, UNSPECIFIED: ICD-10-CM

## 2024-04-19 PROCEDURE — 72148 MRI LUMBAR SPINE W/O DYE: CPT

## 2024-04-19 PROCEDURE — 72148 MRI LUMBAR SPINE W/O DYE: CPT | Mod: 26

## 2024-04-23 ENCOUNTER — NON-APPOINTMENT (OUTPATIENT)
Age: 79
End: 2024-04-23

## 2024-05-06 ENCOUNTER — EMERGENCY (EMERGENCY)
Facility: HOSPITAL | Age: 79
LOS: 1 days | Discharge: HOME | End: 2024-05-06
Attending: EMERGENCY MEDICINE
Payer: MEDICARE

## 2024-05-06 VITALS
TEMPERATURE: 98 F | OXYGEN SATURATION: 99 % | SYSTOLIC BLOOD PRESSURE: 146 MMHG | RESPIRATION RATE: 18 BRPM | WEIGHT: 199.96 LBS | HEIGHT: 65 IN | DIASTOLIC BLOOD PRESSURE: 68 MMHG | HEART RATE: 73 BPM

## 2024-05-06 VITALS
TEMPERATURE: 97 F | DIASTOLIC BLOOD PRESSURE: 67 MMHG | SYSTOLIC BLOOD PRESSURE: 145 MMHG | HEART RATE: 71 BPM | OXYGEN SATURATION: 99 % | RESPIRATION RATE: 16 BRPM

## 2024-05-06 PROCEDURE — 99284 EMERGENCY DEPT VISIT MOD MDM: CPT

## 2024-05-06 PROCEDURE — 99283 EMERGENCY DEPT VISIT LOW MDM: CPT

## 2024-05-06 RX ORDER — LIDOCAINE 4 G/100G
1 CREAM TOPICAL ONCE
Refills: 0 | Status: COMPLETED | OUTPATIENT
Start: 2024-05-06 | End: 2024-05-06

## 2024-05-06 RX ORDER — IBUPROFEN 200 MG
600 TABLET ORAL ONCE
Refills: 0 | Status: COMPLETED | OUTPATIENT
Start: 2024-05-06 | End: 2024-05-06

## 2024-05-06 RX ORDER — ACETAMINOPHEN 500 MG
975 TABLET ORAL ONCE
Refills: 0 | Status: COMPLETED | OUTPATIENT
Start: 2024-05-06 | End: 2024-05-06

## 2024-05-06 RX ADMIN — Medication 975 MILLIGRAM(S): at 13:13

## 2024-05-06 RX ADMIN — Medication 600 MILLIGRAM(S): at 13:15

## 2024-05-06 RX ADMIN — Medication 975 MILLIGRAM(S): at 13:59

## 2024-05-06 RX ADMIN — LIDOCAINE 1 PATCH: 4 CREAM TOPICAL at 13:13

## 2024-05-06 RX ADMIN — Medication 600 MILLIGRAM(S): at 13:59

## 2024-05-06 NOTE — ED PROVIDER NOTE - NSFOLLOWUPINSTRUCTIONS_ED_ALL_ED_FT
Today you were seen in the emergency department for back pain.     Back pain is very common in adults. The cause of back pain is rarely dangerous and the pain often gets better over time. The cause of your back pain may not be known and may include strain of muscles or ligaments, degeneration of the spinal disks, or arthritis. Occasionally the pain may radiate down your leg(s). Over-the-counter medicines to reduce pain and inflammation are often the most helpful. Stretching and remaining active frequently helps the healing process.    It is extremely important for you to follow up with your outpatient provider for further examination and discussion regarding treatment and imaging, if necessary. If you develop any of the following symptoms, return to the ED immediately for re-evaluation:    •Significant trauma or fall, especially if you are over age 65 or have osteoporosis  •Sudden, acute onset of urinary retention or incontinence  •Fecal incontinence  •Loss of sensation (anesthesia) restricted to the area of the buttocks, perineum and inner surfaces of the thighs  •Weakness in the lower limbs    PLEASE KEEP YOUR SCHEDULE APPOINTMENT WITH THE SPINE CENTER ON MAY 5TH 2024.     You received three medications in the emergency department today. One was an NSAID medication (Motrin or Aleve). You may take this medication regularly over the next 2-3 days if it provides you relief. We recommend adding Tylenol, which can also help with pain.     We also provided you with a Lidocaine patch. These can be purchased over the counter. Please following the package instructions for how frequently to change the patch.

## 2024-05-06 NOTE — ED PROVIDER NOTE - ATTENDING CONTRIBUTION TO CARE
I performed a history and physical exam of the patient and discussed their management with the resident and /or advanced care provider. I reviewed the resident and /or ACP's note and agree with the documented findings and plan of care. My medical decision making and observations are found above.  Lungs clear, abd soft, nl n/v exam distal to pain

## 2024-05-06 NOTE — ED ADULT NURSE REASSESSMENT NOTE - NS ED NURSE REASSESS COMMENT FT1
1401 Pt given meds as ordered and feeling a little better and pt has appointment with the dermatologist and wife and pt to go now Pt verbalized understanding of D.c instructions Zunilda

## 2024-05-06 NOTE — ED PROVIDER NOTE - NSICDXPASTMEDICALHX_GEN_ALL_CORE_FT
PAST MEDICAL HISTORY:  Essential hypertension     Former smoker     HLD (hyperlipidemia)     Obesity     KAIDEN (obstructive sleep apnea)

## 2024-05-06 NOTE — ED PROVIDER NOTE - PHYSICAL EXAMINATION
Gen: well appearing, in no acute distress   Head: normal appearing  HEENT: normal conjunctiva, oral mucosa moist, vision grossly intact   Lung: no respiratory distress, speaking in full sentences, CTA b/l, no wheeze, crackles or rhonchi   CV: regular rate and rhythm, no murmurs  Abd: soft, non distended, non tender   MSK: no visible deformities, no midline spinal tenderness in cervical, thoracic or lumbar spine, no anterior hip tenderness, TTP along R sacroiliac joint, 4/5 strength in flexion at the R hip, limited 2/2 pain, normal flexion/extension to NIDA knees and ankle, ambulating with hesitancy 2/2 to discomfort in hip, but walking without assistance and steady gait   Neuro: No focal deficits, AAOx3  Skin: Warm, no rashes   Psych: normal affect

## 2024-05-06 NOTE — ED PROVIDER NOTE - PATIENT PORTAL LINK FT
You can access the FollowMyHealth Patient Portal offered by [facility] by registering at the following website: [email or web address]. By joining [employer]’s FollowMyHealth portal, you will also be able to view your health information using other applications (apps) compatible with our system.

## 2024-05-06 NOTE — ED PROVIDER NOTE - CCCP TRG CHIEF CMPLNT
right leg pain/pain, hip Consent 1/Introductory Paragraph: The rationale for Mohs was explained to the patient and consent was obtained. The risks, benefits and alternatives to therapy were discussed in detail. Specifically, the risks of infection, scarring, pain, bleeding, prolonged wound healing, incomplete removal, allergy to anesthesia, nerve injury, recurrence and the possible need of delayed or additional reconstruction were addressed. Prior to the procedure, the treatment site was clearly identified and confirmed by the patient. All components of Universal Protocol/PAUSE Rule completed.

## 2024-05-06 NOTE — ED PROVIDER NOTE - CLINICAL SUMMARY MEDICAL DECISION MAKING FREE TEXT BOX
Haris: Patient is 79-year-old who presents with right hip and back pain.  Patient with this pain ongoing for months.  Patient has seen orthopedics and has had advanced imaging of this area.  Patient with no focal neurologic changes.  Patient with no trauma to that area.  Pain last night became so severe that he had trouble sleeping.  Patient can still walk.  Will treat pain aggressively do not think further imaging will help us patient has good follow-up

## 2024-05-06 NOTE — ED PROVIDER NOTE - PROGRESS NOTE DETAILS
Philip PGY1: reached out to referrals coordinator to attempt and expedite spine center follow up Philip PGY1: patient has appointment with spine center for 5/8. feeling better following NSAID and lidocaine patch. will d/c with return precautions. both wife and patient comfortable with plan to discharge. would like to make his dermatology appointment outpatient his afternoon.

## 2024-05-06 NOTE — ED ADULT NURSE NOTE - SUICIDE SCREENING QUESTION 2
DAVID JASSO    [id number]    No

## 2024-05-06 NOTE — ED ADULT NURSE NOTE - OBJECTIVE STATEMENT
79-year-old who presents with right hip and back pain.  Patient with this pain ongoing for months.  Patient has seen orthopedics and has had advanced imaging of this area.  Patient with no focal neurologic changes.  Patient with no trauma to that area.  Pain last night now more his right knee hurting Pt has not taken any meds today for the pain.  Pt also has wounds being treated for by derm and medicated for.  Meds to be given as ordered MpuleRN

## 2024-05-06 NOTE — ED PROVIDER NOTE - OBJECTIVE STATEMENT
79-year-old male with past medical history of hypertension, hyperlipidemia presents to emergency department for evaluation of right low back and hip pain, with radiation down back of right leg and into the right knee.  Is able to ambulate but has pain with doing so.  Has been evaluated by outpatient orthopedics, who have done extensive imaging including x-ray of hip and MRI of low back.  Is scheduled to follow-up with spine specialist in approximately 1 month.  Was told he has degenerative disc disease, and arthritis of the hip.  Potential sciatica as well.  Will intermittently take Aleve which relieves pain.  Is also been following with dermatology and allergy for rash on low back, buttocks and back of legs.  Is currently on antibiotics for treatment of "boils".  Denies fevers, chills, incontinence of urine or stool, numbness or tingling in the groin, testicles, urinary symptoms, recent falls or inability to ambulate.  No known drug allergies.

## 2024-05-08 ENCOUNTER — APPOINTMENT (OUTPATIENT)
Dept: ORTHOPEDIC SURGERY | Facility: CLINIC | Age: 79
End: 2024-05-08
Payer: MEDICARE

## 2024-05-08 VITALS — WEIGHT: 200 LBS | BODY MASS INDEX: 33.32 KG/M2 | HEIGHT: 65 IN

## 2024-05-08 PROCEDURE — 99203 OFFICE O/P NEW LOW 30 MIN: CPT

## 2024-05-08 NOTE — PHYSICAL EXAM
[Walker] : ambulates with walker [de-identified] : Examination of the lumbar spine reveals no midline tenderness palpation, step-offs, or skin lesions. Decreased range of motion with respect to flexion, extension, lateral bending, and rotation. No tenderness to palpation of the sciatic notch. No tenderness palpation of the bilateral greater trochanters. No pain with passive internal/external rotation of the hips. No instability of bilateral lower extremities.  Negative NASIR. Negative straight leg raise bilaterally. No bowstring. Negative femoral stretch. 5 out of 5 iliopsoas, hip abductors, hips adductors, quadriceps, hamstrings, gastrocsoleus, tibialis anterior, extensor hallucis longus, peroneals. Grossly intact sensation to light touch bilateral lower extremities. 1+ patellar and Achilles reflexes. Downgoing Babinski. No clonus. Intact proprioception. Palpable pulses. No skin lesion and no edema on the right and left lower extremities. [de-identified] : Lumbar MRI reveals multilevel stenosis.  He does appear to have an intraforaminal disc herniation on the right at L3-4

## 2024-05-08 NOTE — DISCUSSION/SUMMARY
[de-identified] : We discussed further treatment options.  We reviewed his imaging.  He appears most symptomatic in the right L3 nerve distribution.  He appears to have a foraminal disc herniation at this level.  We discussed surgical nonsurgical options.  He is seeing pain management on Friday.  I recommend a selective nerve root block on the right at the L3 nerve root.  Referral was given.  He will follow-up with me afterwards.

## 2024-05-08 NOTE — HISTORY OF PRESENT ILLNESS
[de-identified] : Mr. ROLANDO PERAZA  is a 79 year old male who presents with 3-4 weeks of right buttock and thigh pain.  He did PT 2 weeks ago and now he has bad right knee pain.  He went to the ER on Monday because his pain is so bad.  He has been taking celebrex with minimal pain.   Normal bowel and bladder control.   Denies any recent fevers, chills, sweats, weight loss, or infection.  The patients past medical history, past surgical history, medications, allergies, and social history were reviewed by me today with the patient and documented accordingly.  In addition, the patient's family history, which is noncontributory to their visit, was also reviewed.

## 2024-05-14 ENCOUNTER — APPOINTMENT (OUTPATIENT)
Dept: ORTHOPEDIC SURGERY | Facility: CLINIC | Age: 79
End: 2024-05-14

## 2024-05-15 ENCOUNTER — APPOINTMENT (OUTPATIENT)
Dept: ORTHOPEDIC SURGERY | Facility: CLINIC | Age: 79
End: 2024-05-15
Payer: MEDICARE

## 2024-05-15 DIAGNOSIS — M54.16 RADICULOPATHY, LUMBAR REGION: ICD-10-CM

## 2024-05-15 DIAGNOSIS — M48.07 SPINAL STENOSIS, LUMBOSACRAL REGION: ICD-10-CM

## 2024-05-15 PROCEDURE — 99214 OFFICE O/P EST MOD 30 MIN: CPT

## 2024-05-15 NOTE — HISTORY OF PRESENT ILLNESS
[de-identified] : Mr. ROLANDO PERAZA  is a 79 year old male who presents to the office for a follow-up visit.  He did an injection which provided some relief in his right leg but his pain is beginning to return.

## 2024-05-15 NOTE — PHYSICAL EXAM
[Antalgic] : not antalgic [de-identified] : Examination of the lumbar spine reveals no midline tenderness palpation, step-offs, or skin lesions. Decreased range of motion with respect to flexion, extension, lateral bending, and rotation. No tenderness to palpation of the sciatic notch. No tenderness palpation of the bilateral greater trochanters. No pain with passive internal/external rotation of the hips. No instability of bilateral lower extremities.  Negative NASIR. Negative straight leg raise bilaterally. No bowstring. Negative femoral stretch. 5 out of 5 iliopsoas, hip abductors, hips adductors, quadriceps, hamstrings, gastrocsoleus, tibialis anterior, extensor hallucis longus, peroneals. Grossly intact sensation to light touch bilateral lower extremities. 1+ patellar and Achilles reflexes. Downgoing Babinski. No clonus. Intact proprioception. Palpable pulses. No skin lesion and no edema on the right and left lower extremities. [de-identified] : Lumbar MRI reveals multilevel stenosis.  He does appear to have an intraforaminal disc herniation on the right at L3-4

## 2024-05-15 NOTE — DISCUSSION/SUMMARY
[de-identified] : Overall, he is feeling much better after the injection.  He will continue with conservative measures and consider additional injections.  He will contemplate physical therapy.  Follow-up in 3 weeks time

## 2024-05-16 ENCOUNTER — APPOINTMENT (OUTPATIENT)
Dept: PULMONOLOGY | Facility: CLINIC | Age: 79
End: 2024-05-16

## 2024-05-16 NOTE — END OF VISIT
Pt notified about result and verbalized understanding. Pt would like to hold off on medication and will try to improve her diet and exercise. [Time Spent: ___ minutes] : I have spent [unfilled] minutes of time on the encounter.

## 2024-05-16 NOTE — PROCEDURE
[FreeTextEntry1] : Maskfitting Patient is currently using a ResMed Airfit P10 nasal pillows mask, size Medium. He has an air leak from his mask and opens his mouth while he sleeps but refuses to use a full face mask. Patient was fit with a [employer] Dream[employer] nasal mask, size Large upon recommendation of his wife who uses the same mask. ROLANDO JIMENEZDAMIENLUCERO was instructed to use a chin strap with his mask and was comfortable with the fit. Patient will try this mask and follow up with us within 2 weeks.  Patient complained that his exhalation breaths were difficult so FLEX of 3 was added to his machine.   At first he said he did not feel like he was getting enough air but then when we tried his machine at a higher start pressure, he could not tolerate it.  His pressure was left as it was APAP 4-20 cm H2O.

## 2024-05-22 ENCOUNTER — APPOINTMENT (OUTPATIENT)
Dept: ORTHOPEDIC SURGERY | Facility: CLINIC | Age: 79
End: 2024-05-22
Payer: MEDICARE

## 2024-05-22 VITALS — BODY MASS INDEX: 33.32 KG/M2 | WEIGHT: 200 LBS | HEIGHT: 65 IN

## 2024-05-22 PROCEDURE — 99214 OFFICE O/P EST MOD 30 MIN: CPT

## 2024-05-22 NOTE — HISTORY OF PRESENT ILLNESS
[de-identified] : Mr. ROLANDO PERAZA  is a 79 year old male who presents to the office for a follow-up visit.  Symptoms are grossly stable.

## 2024-05-22 NOTE — PHYSICAL EXAM
[Antalgic] : antalgic [Walker] : ambulates with walker [de-identified] : Examination of the lumbar spine reveals no midline tenderness palpation, step-offs, or skin lesions. Decreased range of motion with respect to flexion, extension, lateral bending, and rotation. No tenderness to palpation of the sciatic notch. No tenderness palpation of the bilateral greater trochanters. No pain with passive internal/external rotation of the hips. No instability of bilateral lower extremities.  Negative NASIR. Negative straight leg raise bilaterally. No bowstring. Negative femoral stretch. 5 out of 5 iliopsoas, hip abductors, hips adductors, quadriceps, hamstrings, gastrocsoleus, tibialis anterior, extensor hallucis longus, peroneals. Grossly intact sensation to light touch bilateral lower extremities. 1+ patellar and Achilles reflexes. Downgoing Babinski. No clonus. Intact proprioception. Palpable pulses. No skin lesion and no edema on the right and left lower extremities. [de-identified] : Lumbar MRI reveals multilevel stenosis.  He does appear to have an intraforaminal disc herniation on the right at L3-4

## 2024-05-22 NOTE — DISCUSSION/SUMMARY
[de-identified] : He appears to have some increased symptoms.  The injection did help him for a time.  We discussed nonsurgical and surgical options.  This point he will try course of physical therapy.  Follow-up afterwards.

## 2024-05-28 ENCOUNTER — APPOINTMENT (OUTPATIENT)
Dept: CARDIOLOGY | Facility: CLINIC | Age: 79
End: 2024-05-28

## 2024-05-28 ENCOUNTER — APPOINTMENT (OUTPATIENT)
Dept: OTOLARYNGOLOGY | Facility: CLINIC | Age: 79
End: 2024-05-28

## 2024-05-28 VITALS
OXYGEN SATURATION: 97 % | DIASTOLIC BLOOD PRESSURE: 74 MMHG | HEART RATE: 74 BPM | BODY MASS INDEX: 32.78 KG/M2 | WEIGHT: 197 LBS | SYSTOLIC BLOOD PRESSURE: 126 MMHG

## 2024-05-28 PROCEDURE — 99213 OFFICE O/P EST LOW 20 MIN: CPT | Mod: 25

## 2024-05-28 PROCEDURE — 93000 ELECTROCARDIOGRAM COMPLETE: CPT

## 2024-05-28 NOTE — DISCUSSION/SUMMARY
[FreeTextEntry1] : Bryon Middleton is a 77 y/o with HTN and Hchol  - cath with severe small vessel CAD;   Cont with lipitor No angina BP acceptable Cont with present meds

## 2024-05-28 NOTE — HISTORY OF PRESENT ILLNESS
[FreeTextEntry1] : Bryon is returning for a follow-up.     No chest pains - no angina BP acceptable Taking lipid therapy - no side effects No LE edema  Doesnt exercise. Diet hasnt been the greatest

## 2024-06-03 ENCOUNTER — APPOINTMENT (OUTPATIENT)
Dept: INTERNAL MEDICINE | Facility: CLINIC | Age: 79
End: 2024-06-03
Payer: MEDICARE

## 2024-06-03 ENCOUNTER — OUTPATIENT (OUTPATIENT)
Dept: OUTPATIENT SERVICES | Facility: HOSPITAL | Age: 79
LOS: 1 days | End: 2024-06-03
Payer: MEDICARE

## 2024-06-03 VITALS
BODY MASS INDEX: 32.82 KG/M2 | SYSTOLIC BLOOD PRESSURE: 120 MMHG | HEIGHT: 65 IN | WEIGHT: 197 LBS | HEART RATE: 92 BPM | DIASTOLIC BLOOD PRESSURE: 60 MMHG | OXYGEN SATURATION: 98 %

## 2024-06-03 DIAGNOSIS — I10 ESSENTIAL (PRIMARY) HYPERTENSION: ICD-10-CM

## 2024-06-03 PROCEDURE — G0463: CPT

## 2024-06-03 PROCEDURE — 99204 OFFICE O/P NEW MOD 45 MIN: CPT

## 2024-06-03 RX ORDER — TIZANIDINE HYDROCHLORIDE 2 MG/1
2 CAPSULE ORAL AT BEDTIME
Qty: 12 | Refills: 0 | Status: COMPLETED | COMMUNITY
Start: 2024-06-03 | End: 2024-06-15

## 2024-06-03 NOTE — HISTORY OF PRESENT ILLNESS
[Musculoskeletal Symptoms Knees] : knee [Episodic] : episodic [Severe] : severe [Ice] : ice [At Night] : at night [Worsening] : worsening [Spouse] : spouse [de-identified] : lower back [FreeTextEntry1] : 4-5 months [FreeTextEntry2] : radiates from knee to thigh to back.  [FreeTextEntry5] : worsens throughout the day.  [FreeTextEntry8] :  Denies f/c, urinary or fecal incontinence, perianal paresthesias.  endorses R knee pain and lower back pain.

## 2024-06-03 NOTE — REVIEW OF SYSTEMS
[Joint Pain] : joint pain [Muscle Pain] : muscle pain [Back Pain] : back pain [Negative] : Integumentary

## 2024-06-03 NOTE — PHYSICAL EXAM
[No Acute Distress] : no acute distress [Well Nourished] : well nourished [Well Developed] : well developed [Normal Sclera/Conjunctiva] : normal sclera/conjunctiva [PERRL] : pupils equal round and reactive to light [EOMI] : extraocular movements intact [Normal Outer Ear/Nose] : the outer ears and nose were normal in appearance [Normal Oropharynx] : the oropharynx was normal [No JVD] : no jugular venous distention [Supple] : supple [No Respiratory Distress] : no respiratory distress  [No Accessory Muscle Use] : no accessory muscle use [Normal Rate] : normal rate  [Normal] : affect was normal and insight and judgment were intact [de-identified] : pain on flexion, pain on palpation lumbar spine.  [de-identified] : pain on passive motion

## 2024-06-03 NOTE — ASSESSMENT
[FreeTextEntry1] : #lower back pain #R knee pain MRI 04/2024 shows multilevel degenerative disc disease, worse at L4-L5, mod to severe foraminal narrowing. Uncertain of why pt failed PT (per wife, elevated legs aggravated pain and discontinued PT).  -re-establish with PT -return to ortho and pain mgmt.  -discontinue Advil, start meloxicam -continue gabapentin 300mg tid prn -robaxin not on formulary, tizanadine 2mg qhs prn

## 2024-06-05 ENCOUNTER — APPOINTMENT (OUTPATIENT)
Dept: SPINE | Facility: CLINIC | Age: 79
End: 2024-06-05

## 2024-06-10 DIAGNOSIS — M54.9 DORSALGIA, UNSPECIFIED: ICD-10-CM

## 2024-06-10 DIAGNOSIS — M51.26 OTHER INTERVERTEBRAL DISC DISPLACEMENT, LUMBAR REGION: ICD-10-CM

## 2024-06-10 DIAGNOSIS — M48.07 SPINAL STENOSIS, LUMBOSACRAL REGION: ICD-10-CM

## 2024-06-10 DIAGNOSIS — M54.50 LOW BACK PAIN, UNSPECIFIED: ICD-10-CM

## 2024-06-14 ENCOUNTER — APPOINTMENT (OUTPATIENT)
Dept: ORTHOPEDIC SURGERY | Facility: CLINIC | Age: 79
End: 2024-06-14
Payer: MEDICARE

## 2024-06-14 VITALS — DIASTOLIC BLOOD PRESSURE: 75 MMHG | SYSTOLIC BLOOD PRESSURE: 148 MMHG | HEART RATE: 74 BPM

## 2024-06-14 DIAGNOSIS — M16.11 UNILATERAL PRIMARY OSTEOARTHRITIS, RIGHT HIP: ICD-10-CM

## 2024-06-14 DIAGNOSIS — M54.50 LOW BACK PAIN, UNSPECIFIED: ICD-10-CM

## 2024-06-14 DIAGNOSIS — M51.26 OTHER INTERVERTEBRAL DISC DISPLACEMENT, LUMBAR REGION: ICD-10-CM

## 2024-06-14 PROCEDURE — 73562 X-RAY EXAM OF KNEE 3: CPT | Mod: RT

## 2024-06-14 PROCEDURE — 99214 OFFICE O/P EST MOD 30 MIN: CPT

## 2024-06-20 PROBLEM — M16.11 PRIMARY OSTEOARTHRITIS OF RIGHT HIP: Status: ACTIVE | Noted: 2024-04-04

## 2024-06-20 PROBLEM — M54.50 LOWER BACK PAIN: Status: ACTIVE | Noted: 2024-04-05

## 2024-06-20 PROBLEM — M51.26 LUMBAR HERNIATED DISC: Status: ACTIVE | Noted: 2024-05-08

## 2024-06-21 ENCOUNTER — APPOINTMENT (OUTPATIENT)
Dept: SLEEP CENTER | Facility: CLINIC | Age: 79
End: 2024-06-21
Payer: MEDICARE

## 2024-06-21 ENCOUNTER — OUTPATIENT (OUTPATIENT)
Dept: OUTPATIENT SERVICES | Facility: HOSPITAL | Age: 79
LOS: 1 days | End: 2024-06-21
Payer: MEDICARE

## 2024-06-21 PROCEDURE — 95811 POLYSOM 6/>YRS CPAP 4/> PARM: CPT | Mod: 26

## 2024-06-21 PROCEDURE — 95811 POLYSOM 6/>YRS CPAP 4/> PARM: CPT

## 2024-06-24 ENCOUNTER — APPOINTMENT (OUTPATIENT)
Dept: OTOLARYNGOLOGY | Facility: CLINIC | Age: 79
End: 2024-06-24
Payer: MEDICARE

## 2024-06-24 VITALS
BODY MASS INDEX: 32.82 KG/M2 | SYSTOLIC BLOOD PRESSURE: 127 MMHG | WEIGHT: 197 LBS | HEIGHT: 65 IN | OXYGEN SATURATION: 97 % | HEART RATE: 64 BPM | DIASTOLIC BLOOD PRESSURE: 76 MMHG

## 2024-06-24 PROCEDURE — 69210 REMOVE IMPACTED EAR WAX UNI: CPT

## 2024-06-24 PROCEDURE — 99203 OFFICE O/P NEW LOW 30 MIN: CPT | Mod: 25

## 2024-06-24 RX ORDER — COLD-HOT PACK
EACH MISCELLANEOUS
Refills: 0 | Status: COMPLETED | COMMUNITY
End: 2024-06-24

## 2024-06-24 RX ORDER — CLOBETASOL PROPIONATE 0.5 MG/G
0.05 CREAM TOPICAL
Qty: 60 | Refills: 0 | Status: COMPLETED | COMMUNITY
Start: 2024-06-19

## 2024-06-24 RX ORDER — CELECOXIB 100 MG/1
100 CAPSULE ORAL
Qty: 60 | Refills: 5 | Status: COMPLETED | COMMUNITY
Start: 2024-04-05 | End: 2024-06-24

## 2024-06-24 RX ORDER — KETOCONAZOLE 20 MG/G
2 CREAM TOPICAL
Qty: 60 | Refills: 0 | Status: COMPLETED | COMMUNITY
Start: 2024-05-06

## 2024-06-24 RX ORDER — SULFAMETHOXAZOLE AND TRIMETHOPRIM 800; 160 MG/1; MG/1
800-160 TABLET ORAL
Qty: 20 | Refills: 0 | Status: COMPLETED | COMMUNITY
Start: 2024-05-01

## 2024-06-24 RX ORDER — GABAPENTIN 300 MG/1
300 CAPSULE ORAL 3 TIMES DAILY
Qty: 90 | Refills: 0 | Status: COMPLETED | COMMUNITY
Start: 2024-06-03 | End: 2024-06-24

## 2024-06-24 RX ORDER — MOMETASONE FUROATE 1 MG/G
0.1 CREAM TOPICAL
Qty: 45 | Refills: 0 | Status: COMPLETED | COMMUNITY
Start: 2024-04-18

## 2024-06-24 RX ORDER — SODIUM SULFATE, POTASSIUM SULFATE AND MAGNESIUM SULFATE 1.6; 3.13; 17.5 G/177ML; G/177ML; G/177ML
17.5-3.13-1.6 SOLUTION ORAL
Qty: 354 | Refills: 0 | Status: COMPLETED | COMMUNITY
Start: 2024-04-17

## 2024-06-24 RX ORDER — MUPIROCIN 20 MG/G
2 OINTMENT TOPICAL
Qty: 22 | Refills: 0 | Status: COMPLETED | COMMUNITY
Start: 2024-05-06

## 2024-06-25 DIAGNOSIS — G47.33 OBSTRUCTIVE SLEEP APNEA (ADULT) (PEDIATRIC): ICD-10-CM

## 2024-07-08 ENCOUNTER — APPOINTMENT (OUTPATIENT)
Dept: OTOLARYNGOLOGY | Facility: CLINIC | Age: 79
End: 2024-07-08

## 2024-07-09 ENCOUNTER — APPOINTMENT (OUTPATIENT)
Dept: PULMONOLOGY | Facility: CLINIC | Age: 79
End: 2024-07-09

## 2024-07-09 PROCEDURE — 99442: CPT

## 2024-07-10 ENCOUNTER — OUTPATIENT (OUTPATIENT)
Dept: OUTPATIENT SERVICES | Facility: HOSPITAL | Age: 79
LOS: 1 days | End: 2024-07-10
Payer: MEDICARE

## 2024-07-10 ENCOUNTER — APPOINTMENT (OUTPATIENT)
Dept: INTERNAL MEDICINE | Facility: CLINIC | Age: 79
End: 2024-07-10
Payer: MEDICARE

## 2024-07-10 VITALS
DIASTOLIC BLOOD PRESSURE: 72 MMHG | SYSTOLIC BLOOD PRESSURE: 120 MMHG | WEIGHT: 193 LBS | BODY MASS INDEX: 32.12 KG/M2 | HEART RATE: 60 BPM | RESPIRATION RATE: 14 BRPM

## 2024-07-10 DIAGNOSIS — I10 ESSENTIAL (PRIMARY) HYPERTENSION: ICD-10-CM

## 2024-07-10 DIAGNOSIS — Z86.19 PERSONAL HISTORY OF OTHER INFECTIOUS AND PARASITIC DISEASES: ICD-10-CM

## 2024-07-10 DIAGNOSIS — E11.9 TYPE 2 DIABETES MELLITUS W/OUT COMPLICATIONS: ICD-10-CM

## 2024-07-10 PROCEDURE — 99213 OFFICE O/P EST LOW 20 MIN: CPT

## 2024-07-10 PROCEDURE — G0463: CPT

## 2024-07-12 LAB
25(OH)D3 SERPL-MCNC: 45.4 NG/ML
ALBUMIN SERPL ELPH-MCNC: 4.3 G/DL
ALP BLD-CCNC: 53 U/L
ALT SERPL-CCNC: 13 U/L
ANION GAP SERPL CALC-SCNC: 11 MMOL/L
BILIRUB SERPL-MCNC: 0.8 MG/DL
BUN SERPL-MCNC: 16 MG/DL
CALCIUM SERPL-MCNC: 10 MG/DL
CHLORIDE SERPL-SCNC: 101 MMOL/L
CHOLEST SERPL-MCNC: 129 MG/DL
CO2 SERPL-SCNC: 28 MMOL/L
CREAT SERPL-MCNC: 0.82 MG/DL
EGFR: 89 ML/MIN/1.73M2
ESTIMATED AVERAGE GLUCOSE: 131 MG/DL
GLUCOSE SERPL-MCNC: 143 MG/DL
HBA1C MFR BLD HPLC: 6.2 %
HCT VFR BLD CALC: 39.2 %
HDLC SERPL-MCNC: 52 MG/DL
HGB BLD-MCNC: 12.8 G/DL
LDLC SERPL CALC-MCNC: 65 MG/DL
MCHC RBC-ENTMCNC: 30.4 PG
MCHC RBC-ENTMCNC: 32.7 GM/DL
NONHDLC SERPL-MCNC: 77 MG/DL
PLATELET # BLD AUTO: 221 K/UL
POTASSIUM SERPL-SCNC: 4.9 MMOL/L
PROT SERPL-MCNC: 7.3 G/DL
RBC # BLD: 4.21 M/UL
RBC # FLD: 15.2 %
TRIGL SERPL-MCNC: 52 MG/DL
TSH SERPL-ACNC: 2.12 UIU/ML
VIT B12 SERPL-MCNC: 336 PG/ML
WBC # FLD AUTO: 8.71 K/UL

## 2024-07-15 ENCOUNTER — APPOINTMENT (OUTPATIENT)
Dept: OTOLARYNGOLOGY | Facility: CLINIC | Age: 79
End: 2024-07-15
Payer: MEDICARE

## 2024-07-15 VITALS
SYSTOLIC BLOOD PRESSURE: 138 MMHG | WEIGHT: 189 LBS | HEIGHT: 65 IN | DIASTOLIC BLOOD PRESSURE: 72 MMHG | HEART RATE: 70 BPM | BODY MASS INDEX: 31.49 KG/M2

## 2024-07-15 DIAGNOSIS — G47.33 OBSTRUCTIVE SLEEP APNEA (ADULT) (PEDIATRIC): ICD-10-CM

## 2024-07-15 DIAGNOSIS — E11.9 TYPE 2 DIABETES MELLITUS WITHOUT COMPLICATIONS: ICD-10-CM

## 2024-07-15 PROCEDURE — 31575 DIAGNOSTIC LARYNGOSCOPY: CPT

## 2024-07-15 PROCEDURE — 99214 OFFICE O/P EST MOD 30 MIN: CPT | Mod: 25

## 2024-07-24 ENCOUNTER — APPOINTMENT (OUTPATIENT)
Dept: OTOLARYNGOLOGY | Facility: CLINIC | Age: 79
End: 2024-07-24

## 2024-07-24 PROCEDURE — 99213 OFFICE O/P EST LOW 20 MIN: CPT

## 2024-07-24 NOTE — ASSESSMENT
[FreeTextEntry1] : 79 year old male with bilateral hearing loss, cerumen removed. will return for audio as milan currently out of order

## 2024-07-24 NOTE — HISTORY OF PRESENT ILLNESS
[de-identified] : 79 year old male presents for evaluation of hearing. Last seen 6/24 at which time cerumen removed, audiology milan was out of order, returns today for hearing examination. Expresses concern for possible gradual hearing loss. Occasional noises in the ears - describes as a "buzzing sound". Denies otalgia, otorrhea and infections.

## 2024-08-02 ENCOUNTER — APPOINTMENT (OUTPATIENT)
Dept: PULMONOLOGY | Facility: CLINIC | Age: 79
End: 2024-08-02

## 2024-08-02 VITALS
DIASTOLIC BLOOD PRESSURE: 73 MMHG | SYSTOLIC BLOOD PRESSURE: 119 MMHG | HEIGHT: 65 IN | BODY MASS INDEX: 31.49 KG/M2 | HEART RATE: 73 BPM | WEIGHT: 189 LBS | OXYGEN SATURATION: 96 %

## 2024-08-02 PROCEDURE — 99213 OFFICE O/P EST LOW 20 MIN: CPT

## 2024-08-14 ENCOUNTER — APPOINTMENT (OUTPATIENT)
Dept: OTOLARYNGOLOGY | Facility: HOSPITAL | Age: 79
End: 2024-08-14

## 2024-08-27 NOTE — ASSESSMENT
[FreeTextEntry1] : Goal blood pressure /90 At goal Not at goal Urged to limit sodium intake     Urged to limit diet  and fat and cholesterol  intake Urged to maintain diet and exercise habits to keep BMI < 28 Limit alcohol consumption to < 7drinks per week Reinforced adherence to medication regimen Check lipid profile and direct LDL   Goal A1c LT 7%   7.5%   8% At goal   Not at goal Limit carbohydrates in diet Exercise 5-6 days per week Advocated dilated retinal exam yearly Proper foot care reviewed

## 2024-08-27 NOTE — HISTORY OF PRESENT ILLNESS
[de-identified] : Mr. PERAZA comes in for reassessment of hypertension, diabetes mellitus  and hyperlipidemia.   His  FS at home in range of XXX. There have been  no hypoglycemic reactions.  Has been doing well with low fat, calorie restricted diet. Limits alcohol. Moderate exercise at least 150 minutes per week   He denies edema, chest pain, dizziness, LAKHANI, PND and orthopnea.  He  denies foot lesions and dysthesias.   Denies visual problems,  nocturia and  weight change.  He  has had no problems with his medications.

## 2024-08-29 ENCOUNTER — APPOINTMENT (OUTPATIENT)
Dept: INTERNAL MEDICINE | Facility: CLINIC | Age: 79
End: 2024-08-29

## 2024-09-10 ENCOUNTER — APPOINTMENT (OUTPATIENT)
Dept: INTERNAL MEDICINE | Facility: CLINIC | Age: 79
End: 2024-09-10

## 2024-09-12 ENCOUNTER — APPOINTMENT (OUTPATIENT)
Dept: SLEEP CENTER | Facility: CLINIC | Age: 79
End: 2024-09-12

## 2024-09-26 ENCOUNTER — RX RENEWAL (OUTPATIENT)
Age: 79
End: 2024-09-26

## 2024-09-28 ENCOUNTER — APPOINTMENT (OUTPATIENT)
Dept: SLEEP CENTER | Facility: CLINIC | Age: 79
End: 2024-09-28

## 2024-09-28 PROCEDURE — 95811 POLYSOM 6/>YRS CPAP 4/> PARM: CPT | Mod: 26

## 2024-10-16 ENCOUNTER — APPOINTMENT (OUTPATIENT)
Dept: PULMONOLOGY | Facility: CLINIC | Age: 79
End: 2024-10-16
Payer: MEDICARE

## 2024-10-16 VITALS
BODY MASS INDEX: 32.89 KG/M2 | HEIGHT: 65 IN | WEIGHT: 197.38 LBS | RESPIRATION RATE: 15 BRPM | SYSTOLIC BLOOD PRESSURE: 129 MMHG | TEMPERATURE: 97.6 F | HEART RATE: 76 BPM | OXYGEN SATURATION: 98 % | DIASTOLIC BLOOD PRESSURE: 71 MMHG

## 2024-10-16 DIAGNOSIS — G47.33 OBSTRUCTIVE SLEEP APNEA (ADULT) (PEDIATRIC): ICD-10-CM

## 2024-10-16 PROCEDURE — 99213 OFFICE O/P EST LOW 20 MIN: CPT

## 2024-10-24 ENCOUNTER — OUTPATIENT (OUTPATIENT)
Dept: OUTPATIENT SERVICES | Facility: HOSPITAL | Age: 79
LOS: 1 days | End: 2024-10-24
Payer: MEDICARE

## 2024-10-24 ENCOUNTER — APPOINTMENT (OUTPATIENT)
Dept: INTERNAL MEDICINE | Facility: CLINIC | Age: 79
End: 2024-10-24

## 2024-10-24 VITALS
BODY MASS INDEX: 32.65 KG/M2 | OXYGEN SATURATION: 97 % | SYSTOLIC BLOOD PRESSURE: 124 MMHG | WEIGHT: 196 LBS | HEIGHT: 65 IN | DIASTOLIC BLOOD PRESSURE: 70 MMHG | HEART RATE: 88 BPM

## 2024-10-24 DIAGNOSIS — J02.9 ACUTE PHARYNGITIS, UNSPECIFIED: ICD-10-CM

## 2024-10-24 DIAGNOSIS — J06.9 ACUTE UPPER RESPIRATORY INFECTION, UNSPECIFIED: ICD-10-CM

## 2024-10-24 PROCEDURE — G0463: CPT

## 2024-10-24 PROCEDURE — 99213 OFFICE O/P EST LOW 20 MIN: CPT | Mod: GC

## 2024-10-24 RX ORDER — BENZONATATE 100 MG/1
100 CAPSULE ORAL
Qty: 30 | Refills: 0 | Status: ACTIVE | COMMUNITY
Start: 2024-10-24 | End: 1900-01-01

## 2024-10-25 LAB
INFLUENZA A RESULT: NOT DETECTED
INFLUENZA B RESULT: NOT DETECTED
RESP SYN VIRUS RESULT: NOT DETECTED
SARS-COV-2 RESULT: NOT DETECTED

## 2024-10-28 DIAGNOSIS — I10 ESSENTIAL (PRIMARY) HYPERTENSION: ICD-10-CM

## 2024-10-31 ENCOUNTER — DOCTOR'S OFFICE (OUTPATIENT)
Facility: LOCATION | Age: 79
Setting detail: OPHTHALMOLOGY
End: 2024-10-31
Payer: MEDICARE

## 2024-10-31 DIAGNOSIS — H02.401: ICD-10-CM

## 2024-10-31 DIAGNOSIS — H02.31: ICD-10-CM

## 2024-10-31 DIAGNOSIS — H25.13: ICD-10-CM

## 2024-10-31 DIAGNOSIS — E11.3213: ICD-10-CM

## 2024-10-31 DIAGNOSIS — H52.7: ICD-10-CM

## 2024-10-31 DIAGNOSIS — H52.4: ICD-10-CM

## 2024-10-31 DIAGNOSIS — H11.153: ICD-10-CM

## 2024-10-31 DIAGNOSIS — H02.34: ICD-10-CM

## 2024-10-31 DIAGNOSIS — H35.89: ICD-10-CM

## 2024-10-31 DIAGNOSIS — D31.02: ICD-10-CM

## 2024-10-31 PROCEDURE — 92015 DETERMINE REFRACTIVE STATE: CPT | Performed by: OPHTHALMOLOGY

## 2024-10-31 PROCEDURE — 92134 CPTRZ OPH DX IMG PST SGM RTA: CPT | Performed by: OPHTHALMOLOGY

## 2024-10-31 PROCEDURE — 92014 COMPRE OPH EXAM EST PT 1/>: CPT | Performed by: OPHTHALMOLOGY

## 2024-10-31 ASSESSMENT — REFRACTION_MANIFEST
OS_AXIS: 040
OD_SPHERE: -0.50
OD_CYLINDER: +0.50
OD_CYLINDER: +0.50
OS_ADD: +3.25
OS_AXIS: 030
OD_AXIS: 123
OD_CYLINDER: +0.75
OS_SPHERE: -0.75
OS_SPHERE: +2.75
OD_ADD: +3.25
OS_VA1: 20/30-
OD_SPHERE: +0.75
OS_SPHERE: PLANO
OD_SPHERE: +3.50
OD_VA2: 20/20(J1+)
OS_ADD: +3.25
OD_ADD: +3.25
OD_VA1: 20/25-
OS_CYLINDER: +1.00
OS_VA1: 20/30
OS_CYLINDER: +1.25
OD_VA1: 20/30+
OD_SPHERE: -0.25
OD_AXIS: 125
OS_CYLINDER: +1.50
OS_SPHERE: +1.00
OD_AXIS: 124
OS_CYLINDER: 0.00
OS_VA1: 20/20
OS_VA2: 20/20(J1+)
OD_AXIS: 120
OD_VA1: 20/30
OS_AXIS: 062
OD_CYLINDER: +0.75
OS_AXIS: 053

## 2024-10-31 ASSESSMENT — REFRACTION_CURRENTRX
OD_SPHERE: +0.75
OS_OVR_VA: 20/
OS_OVR_VA: 20/
OD_CYLINDER: +0.50
OD_AXIS: 051
OD_CYLINDER: +1.25
OS_AXIS: 053
OD_OVR_VA: 20/
OD_AXIS: 124
OS_CYLINDER: +0.75
OS_CYLINDER: +1.00
OS_AXIS: 130
OD_OVR_VA: 20/
OS_VPRISM_DIRECTION: SV
OD_VPRISM_DIRECTION: SV
OS_SPHERE: +1.00
OD_SPHERE: +3.25
OS_SPHERE: +3.00

## 2024-10-31 ASSESSMENT — TONOMETRY
OS_IOP_MMHG: 20
OD_IOP_MMHG: 18

## 2024-10-31 ASSESSMENT — CONFRONTATIONAL VISUAL FIELD TEST (CVF)
OS_FINDINGS: FULL
OD_FINDINGS: FULL

## 2024-10-31 ASSESSMENT — VISUAL ACUITY
OS_BCVA: 20/25
OD_BCVA: 20/25+2

## 2024-11-08 ENCOUNTER — APPOINTMENT (OUTPATIENT)
Dept: ORTHOPEDIC SURGERY | Facility: CLINIC | Age: 79
End: 2024-11-08
Payer: MEDICARE

## 2024-11-08 VITALS
HEIGHT: 65 IN | WEIGHT: 196 LBS | BODY MASS INDEX: 32.65 KG/M2 | SYSTOLIC BLOOD PRESSURE: 122 MMHG | DIASTOLIC BLOOD PRESSURE: 79 MMHG

## 2024-11-08 DIAGNOSIS — M51.26 OTHER INTERVERTEBRAL DISC DISPLACEMENT, LUMBAR REGION: ICD-10-CM

## 2024-11-08 DIAGNOSIS — M54.16 RADICULOPATHY, LUMBAR REGION: ICD-10-CM

## 2024-11-08 PROCEDURE — 99214 OFFICE O/P EST MOD 30 MIN: CPT

## 2024-11-12 ENCOUNTER — APPOINTMENT (OUTPATIENT)
Dept: INTERNAL MEDICINE | Facility: CLINIC | Age: 79
End: 2024-11-12
Payer: MEDICARE

## 2024-11-12 ENCOUNTER — OUTPATIENT (OUTPATIENT)
Dept: OUTPATIENT SERVICES | Facility: HOSPITAL | Age: 79
LOS: 1 days | End: 2024-11-12
Payer: MEDICARE

## 2024-11-12 VITALS — HEART RATE: 74 BPM | DIASTOLIC BLOOD PRESSURE: 80 MMHG | SYSTOLIC BLOOD PRESSURE: 126 MMHG | RESPIRATION RATE: 14 BRPM

## 2024-11-12 DIAGNOSIS — E11.9 TYPE 2 DIABETES MELLITUS W/OUT COMPLICATIONS: ICD-10-CM

## 2024-11-12 DIAGNOSIS — M48.07 SPINAL STENOSIS, LUMBOSACRAL REGION: ICD-10-CM

## 2024-11-12 DIAGNOSIS — I10 ESSENTIAL (PRIMARY) HYPERTENSION: ICD-10-CM

## 2024-11-12 DIAGNOSIS — E11.9 TYPE 2 DIABETES MELLITUS WITHOUT COMPLICATIONS: ICD-10-CM

## 2024-11-12 DIAGNOSIS — Z87.09 PERSONAL HISTORY OF OTHER DISEASES OF THE RESPIRATORY SYSTEM: ICD-10-CM

## 2024-11-12 DIAGNOSIS — J06.9 ACUTE UPPER RESPIRATORY INFECTION, UNSPECIFIED: ICD-10-CM

## 2024-11-12 PROCEDURE — 99213 OFFICE O/P EST LOW 20 MIN: CPT

## 2024-11-12 PROCEDURE — G0463: CPT

## 2024-11-12 RX ORDER — BLOOD-GLUCOSE METER
W/DEVICE KIT MISCELLANEOUS
Qty: 1 | Refills: 10 | Status: ACTIVE | COMMUNITY
Start: 2024-11-12 | End: 1900-01-01

## 2024-11-12 RX ORDER — DULOXETINE HYDROCHLORIDE 20 MG/1
20 CAPSULE, DELAYED RELEASE PELLETS ORAL
Qty: 30 | Refills: 3 | Status: ACTIVE | COMMUNITY
Start: 2024-11-12 | End: 1900-01-01

## 2024-11-12 RX ORDER — BLOOD-GLUCOSE METER
KIT MISCELLANEOUS
Qty: 3 | Refills: 5 | Status: ACTIVE | COMMUNITY
Start: 2024-11-12 | End: 1900-01-01

## 2024-11-13 ENCOUNTER — APPOINTMENT (OUTPATIENT)
Dept: PHYSICAL MEDICINE AND REHAB | Facility: CLINIC | Age: 79
End: 2024-11-13
Payer: MEDICARE

## 2024-11-13 DIAGNOSIS — M48.062 SPINAL STENOSIS, LUMBAR REGION WITH NEUROGENIC CLAUDICATION: ICD-10-CM

## 2024-11-13 DIAGNOSIS — M54.16 RADICULOPATHY, LUMBAR REGION: ICD-10-CM

## 2024-11-13 PROCEDURE — 99204 OFFICE O/P NEW MOD 45 MIN: CPT

## 2024-11-15 LAB
ALBUMIN SERPL ELPH-MCNC: 4.1 G/DL
ALP BLD-CCNC: 56 U/L
ALT SERPL-CCNC: 10 U/L
ANION GAP SERPL CALC-SCNC: 11 MMOL/L
AST SERPL-CCNC: 11 U/L
BILIRUB SERPL-MCNC: 0.9 MG/DL
BUN SERPL-MCNC: 14 MG/DL
CALCIUM SERPL-MCNC: 9.5 MG/DL
CHLORIDE SERPL-SCNC: 103 MMOL/L
CHOLEST SERPL-MCNC: 117 MG/DL
CO2 SERPL-SCNC: 26 MMOL/L
CREAT SERPL-MCNC: 0.68 MG/DL
EGFR: 94 ML/MIN/1.73M2
ESTIMATED AVERAGE GLUCOSE: 126 MG/DL
GLUCOSE SERPL-MCNC: 115 MG/DL
HBA1C MFR BLD HPLC: 6 %
HCT VFR BLD CALC: 38.8 %
HDLC SERPL-MCNC: 49 MG/DL
HGB BLD-MCNC: 12.6 G/DL
LDLC SERPL CALC-MCNC: 55 MG/DL
MCHC RBC-ENTMCNC: 30.1 PG
MCHC RBC-ENTMCNC: 32.5 G/DL
MCV RBC AUTO: 92.6 FL
NONHDLC SERPL-MCNC: 68 MG/DL
PLATELET # BLD AUTO: 201 K/UL
POTASSIUM SERPL-SCNC: 4.5 MMOL/L
PROT SERPL-MCNC: 7 G/DL
RBC # BLD: 4.19 M/UL
RBC # FLD: 14.4 %
SODIUM SERPL-SCNC: 140 MMOL/L
TRIGL SERPL-MCNC: 55 MG/DL
WBC # FLD AUTO: 5.46 K/UL

## 2024-11-20 RX ORDER — MELOXICAM 15 MG/1
15 TABLET ORAL DAILY
Qty: 30 | Refills: 0 | Status: ACTIVE | COMMUNITY
Start: 2024-11-20 | End: 1900-01-01

## 2024-11-27 RX ORDER — DICLOFENAC SODIUM 50 MG/1
50 TABLET, DELAYED RELEASE ORAL
Qty: 28 | Refills: 0 | Status: ACTIVE | COMMUNITY
Start: 2024-11-27 | End: 1900-01-01

## 2024-12-06 ENCOUNTER — APPOINTMENT (OUTPATIENT)
Dept: PHYSICAL MEDICINE AND REHAB | Facility: CLINIC | Age: 79
End: 2024-12-06
Payer: MEDICARE

## 2024-12-06 ENCOUNTER — OUTPATIENT (OUTPATIENT)
Dept: OUTPATIENT SERVICES | Facility: HOSPITAL | Age: 79
LOS: 1 days | End: 2024-12-06
Payer: MEDICARE

## 2024-12-06 DIAGNOSIS — M54.16 RADICULOPATHY, LUMBAR REGION: ICD-10-CM

## 2024-12-06 DIAGNOSIS — M51.26 OTHER INTERVERTEBRAL DISC DISPLACEMENT, LUMBAR REGION: ICD-10-CM

## 2024-12-06 DIAGNOSIS — M48.062 SPINAL STENOSIS, LUMBAR REGION WITH NEUROGENIC CLAUDICATION: ICD-10-CM

## 2024-12-06 PROCEDURE — 64483 NJX AA&/STRD TFRM EPI L/S 1: CPT | Mod: RT

## 2024-12-06 PROCEDURE — 64483 NJX AA&/STRD TFRM EPI L/S 1: CPT

## 2024-12-13 ENCOUNTER — APPOINTMENT (OUTPATIENT)
Dept: CARDIOLOGY | Facility: CLINIC | Age: 79
End: 2024-12-13
Payer: MEDICARE

## 2024-12-13 ENCOUNTER — NON-APPOINTMENT (OUTPATIENT)
Age: 79
End: 2024-12-13

## 2024-12-13 VITALS
TEMPERATURE: 98.2 F | DIASTOLIC BLOOD PRESSURE: 71 MMHG | HEART RATE: 85 BPM | OXYGEN SATURATION: 97 % | SYSTOLIC BLOOD PRESSURE: 118 MMHG

## 2024-12-13 DIAGNOSIS — I10 ESSENTIAL (PRIMARY) HYPERTENSION: ICD-10-CM

## 2024-12-13 DIAGNOSIS — I25.10 ATHEROSCLEROTIC HEART DISEASE OF NATIVE CORONARY ARTERY W/OUT ANGINA PECTORIS: ICD-10-CM

## 2024-12-13 DIAGNOSIS — E78.5 HYPERLIPIDEMIA, UNSPECIFIED: ICD-10-CM

## 2024-12-13 PROCEDURE — 93000 ELECTROCARDIOGRAM COMPLETE: CPT

## 2024-12-13 PROCEDURE — 99214 OFFICE O/P EST MOD 30 MIN: CPT | Mod: 25

## 2024-12-30 ENCOUNTER — APPOINTMENT (OUTPATIENT)
Dept: ORTHOPEDIC SURGERY | Facility: CLINIC | Age: 79
End: 2024-12-30

## 2025-01-02 ENCOUNTER — APPOINTMENT (OUTPATIENT)
Dept: PHYSICAL MEDICINE AND REHAB | Facility: CLINIC | Age: 80
End: 2025-01-02
Payer: MEDICARE

## 2025-01-02 DIAGNOSIS — M54.16 RADICULOPATHY, LUMBAR REGION: ICD-10-CM

## 2025-01-02 PROCEDURE — 99213 OFFICE O/P EST LOW 20 MIN: CPT

## 2025-01-10 ENCOUNTER — APPOINTMENT (OUTPATIENT)
Dept: ORTHOPEDIC SURGERY | Facility: CLINIC | Age: 80
End: 2025-01-10

## 2025-01-13 ENCOUNTER — NON-APPOINTMENT (OUTPATIENT)
Age: 80
End: 2025-01-13

## 2025-01-13 ENCOUNTER — APPOINTMENT (OUTPATIENT)
Dept: SPINE | Facility: CLINIC | Age: 80
End: 2025-01-13
Payer: MEDICARE

## 2025-01-13 VITALS
OXYGEN SATURATION: 97 % | BODY MASS INDEX: 31.65 KG/M2 | HEIGHT: 65 IN | SYSTOLIC BLOOD PRESSURE: 130 MMHG | HEART RATE: 88 BPM | DIASTOLIC BLOOD PRESSURE: 74 MMHG | WEIGHT: 190 LBS

## 2025-01-13 DIAGNOSIS — M48.07 SPINAL STENOSIS, LUMBOSACRAL REGION: ICD-10-CM

## 2025-01-13 PROCEDURE — 99203 OFFICE O/P NEW LOW 30 MIN: CPT

## 2025-01-13 RX ORDER — COLLAGENASE CLOSTRIDIUM HIST.
POWDER (EA) MISCELLANEOUS
Refills: 0 | Status: ACTIVE | COMMUNITY

## 2025-01-16 ENCOUNTER — APPOINTMENT (OUTPATIENT)
Dept: RADIOLOGY | Facility: CLINIC | Age: 80
End: 2025-01-16
Payer: MEDICARE

## 2025-01-16 ENCOUNTER — OUTPATIENT (OUTPATIENT)
Dept: OUTPATIENT SERVICES | Facility: HOSPITAL | Age: 80
LOS: 1 days | End: 2025-01-16
Payer: MEDICARE

## 2025-01-16 ENCOUNTER — APPOINTMENT (OUTPATIENT)
Dept: MRI IMAGING | Facility: CLINIC | Age: 80
End: 2025-01-16
Payer: MEDICARE

## 2025-01-16 DIAGNOSIS — M48.07 SPINAL STENOSIS, LUMBOSACRAL REGION: ICD-10-CM

## 2025-01-16 PROCEDURE — 72148 MRI LUMBAR SPINE W/O DYE: CPT | Mod: 26

## 2025-01-16 PROCEDURE — 72110 X-RAY EXAM L-2 SPINE 4/>VWS: CPT | Mod: 26

## 2025-01-16 PROCEDURE — 72148 MRI LUMBAR SPINE W/O DYE: CPT | Mod: MD

## 2025-01-16 PROCEDURE — 72110 X-RAY EXAM L-2 SPINE 4/>VWS: CPT

## 2025-01-21 ENCOUNTER — APPOINTMENT (OUTPATIENT)
Dept: PULMONOLOGY | Facility: CLINIC | Age: 80
End: 2025-01-21
Payer: MEDICARE

## 2025-01-21 DIAGNOSIS — G47.33 OBSTRUCTIVE SLEEP APNEA (ADULT) (PEDIATRIC): ICD-10-CM

## 2025-01-21 PROCEDURE — 94660 CPAP INITIATION&MGMT: CPT

## 2025-01-24 ENCOUNTER — OUTPATIENT (OUTPATIENT)
Dept: OUTPATIENT SERVICES | Facility: HOSPITAL | Age: 80
LOS: 1 days | End: 2025-01-24
Payer: MEDICARE

## 2025-01-24 ENCOUNTER — APPOINTMENT (OUTPATIENT)
Dept: INTERNAL MEDICINE | Facility: CLINIC | Age: 80
End: 2025-01-24
Payer: MEDICARE

## 2025-01-24 DIAGNOSIS — I10 ESSENTIAL (PRIMARY) HYPERTENSION: ICD-10-CM

## 2025-01-24 DIAGNOSIS — E11.9 TYPE 2 DIABETES MELLITUS W/OUT COMPLICATIONS: ICD-10-CM

## 2025-01-24 DIAGNOSIS — E78.5 HYPERLIPIDEMIA, UNSPECIFIED: ICD-10-CM

## 2025-01-24 PROCEDURE — 99214 OFFICE O/P EST MOD 30 MIN: CPT

## 2025-01-24 PROCEDURE — G0463: CPT

## 2025-01-24 PROCEDURE — G2211 COMPLEX E/M VISIT ADD ON: CPT

## 2025-01-25 VITALS
WEIGHT: 190 LBS | DIASTOLIC BLOOD PRESSURE: 76 MMHG | RESPIRATION RATE: 14 BRPM | SYSTOLIC BLOOD PRESSURE: 118 MMHG | BODY MASS INDEX: 31.62 KG/M2 | HEART RATE: 76 BPM

## 2025-02-03 ENCOUNTER — APPOINTMENT (OUTPATIENT)
Dept: SPINE | Facility: CLINIC | Age: 80
End: 2025-02-03
Payer: MEDICARE

## 2025-02-03 VITALS
BODY MASS INDEX: 31.65 KG/M2 | SYSTOLIC BLOOD PRESSURE: 127 MMHG | OXYGEN SATURATION: 96 % | HEART RATE: 76 BPM | DIASTOLIC BLOOD PRESSURE: 86 MMHG | HEIGHT: 65 IN | WEIGHT: 190 LBS

## 2025-02-03 DIAGNOSIS — M54.50 LOW BACK PAIN, UNSPECIFIED: ICD-10-CM

## 2025-02-03 DIAGNOSIS — E78.5 HYPERLIPIDEMIA, UNSPECIFIED: ICD-10-CM

## 2025-02-03 DIAGNOSIS — M54.16 RADICULOPATHY, LUMBAR REGION: ICD-10-CM

## 2025-02-03 DIAGNOSIS — E11.9 TYPE 2 DIABETES MELLITUS WITHOUT COMPLICATIONS: ICD-10-CM

## 2025-02-03 DIAGNOSIS — M48.07 SPINAL STENOSIS, LUMBOSACRAL REGION: ICD-10-CM

## 2025-02-03 PROCEDURE — 99213 OFFICE O/P EST LOW 20 MIN: CPT

## 2025-02-20 ENCOUNTER — APPOINTMENT (OUTPATIENT)
Dept: ORTHOPEDIC SURGERY | Facility: CLINIC | Age: 80
End: 2025-02-20

## 2025-02-21 ENCOUNTER — APPOINTMENT (OUTPATIENT)
Dept: CT IMAGING | Facility: CLINIC | Age: 80
End: 2025-02-21

## 2025-02-21 ENCOUNTER — OUTPATIENT (OUTPATIENT)
Dept: OUTPATIENT SERVICES | Facility: HOSPITAL | Age: 80
LOS: 1 days | End: 2025-02-21
Payer: MEDICARE

## 2025-02-21 DIAGNOSIS — M48.061 SPINAL STENOSIS, LUMBAR REGION WITHOUT NEUROGENIC CLAUDICATION: ICD-10-CM

## 2025-02-21 DIAGNOSIS — M54.17 RADICULOPATHY, LUMBOSACRAL REGION: ICD-10-CM

## 2025-02-21 PROCEDURE — 72131 CT LUMBAR SPINE W/O DYE: CPT | Mod: MH

## 2025-02-21 PROCEDURE — 72131 CT LUMBAR SPINE W/O DYE: CPT | Mod: 26

## 2025-03-04 ENCOUNTER — APPOINTMENT (OUTPATIENT)
Dept: PAIN MANAGEMENT | Facility: CLINIC | Age: 80
End: 2025-03-04
Payer: MEDICARE

## 2025-03-04 VITALS — BODY MASS INDEX: 31.65 KG/M2 | HEIGHT: 65 IN | WEIGHT: 190 LBS

## 2025-03-04 DIAGNOSIS — M47.816 SPONDYLOSIS W/OUT MYELOPATHY OR RADICULOPATHY, LUMBAR REGION: ICD-10-CM

## 2025-03-04 PROCEDURE — 99204 OFFICE O/P NEW MOD 45 MIN: CPT

## 2025-03-04 RX ORDER — COLLAGEN, HYDROLYSATE (BOVINE) 100 %
POWDER (GRAM) MISCELLANEOUS
Refills: 0 | Status: ACTIVE | COMMUNITY

## 2025-03-10 ENCOUNTER — NON-APPOINTMENT (OUTPATIENT)
Age: 80
End: 2025-03-10

## 2025-03-12 ENCOUNTER — APPOINTMENT (OUTPATIENT)
Dept: PULMONOLOGY | Facility: CLINIC | Age: 80
End: 2025-03-12

## 2025-03-13 ENCOUNTER — APPOINTMENT (OUTPATIENT)
Dept: PAIN MANAGEMENT | Facility: CLINIC | Age: 80
End: 2025-03-13

## 2025-03-14 ENCOUNTER — APPOINTMENT (OUTPATIENT)
Dept: ORTHOPEDIC SURGERY | Facility: CLINIC | Age: 80
End: 2025-03-14
Payer: MEDICARE

## 2025-03-14 VITALS — BODY MASS INDEX: 31.65 KG/M2 | WEIGHT: 190 LBS | HEIGHT: 65 IN

## 2025-03-14 DIAGNOSIS — M17.0 BILATERAL PRIMARY OSTEOARTHRITIS OF KNEE: ICD-10-CM

## 2025-03-14 PROCEDURE — 99215 OFFICE O/P EST HI 40 MIN: CPT

## 2025-03-14 PROCEDURE — 73562 X-RAY EXAM OF KNEE 3: CPT | Mod: RT

## 2025-03-20 ENCOUNTER — APPOINTMENT (OUTPATIENT)
Dept: PAIN MANAGEMENT | Facility: CLINIC | Age: 80
End: 2025-03-20

## 2025-03-24 ENCOUNTER — APPOINTMENT (OUTPATIENT)
Dept: PULMONOLOGY | Facility: CLINIC | Age: 80
End: 2025-03-24
Payer: MEDICARE

## 2025-03-24 VITALS
BODY MASS INDEX: 32.15 KG/M2 | DIASTOLIC BLOOD PRESSURE: 71 MMHG | HEIGHT: 65 IN | TEMPERATURE: 98.2 F | WEIGHT: 193 LBS | SYSTOLIC BLOOD PRESSURE: 118 MMHG | OXYGEN SATURATION: 96 % | HEART RATE: 73 BPM

## 2025-03-24 DIAGNOSIS — G47.33 OBSTRUCTIVE SLEEP APNEA (ADULT) (PEDIATRIC): ICD-10-CM

## 2025-03-24 PROCEDURE — G2211 COMPLEX E/M VISIT ADD ON: CPT

## 2025-03-24 PROCEDURE — 99214 OFFICE O/P EST MOD 30 MIN: CPT

## 2025-03-25 ENCOUNTER — APPOINTMENT (OUTPATIENT)
Dept: CARDIOLOGY | Facility: CLINIC | Age: 80
End: 2025-03-25
Payer: MEDICARE

## 2025-03-25 ENCOUNTER — NON-APPOINTMENT (OUTPATIENT)
Age: 80
End: 2025-03-25

## 2025-03-25 ENCOUNTER — OUTPATIENT (OUTPATIENT)
Dept: OUTPATIENT SERVICES | Facility: HOSPITAL | Age: 80
LOS: 1 days | End: 2025-03-25
Payer: MEDICARE

## 2025-03-25 ENCOUNTER — APPOINTMENT (OUTPATIENT)
Dept: SLEEP CENTER | Facility: CLINIC | Age: 80
End: 2025-03-25
Payer: MEDICARE

## 2025-03-25 VITALS
WEIGHT: 190 LBS | SYSTOLIC BLOOD PRESSURE: 118 MMHG | HEART RATE: 62 BPM | HEIGHT: 65 IN | BODY MASS INDEX: 31.65 KG/M2 | DIASTOLIC BLOOD PRESSURE: 66 MMHG | OXYGEN SATURATION: 96 %

## 2025-03-25 DIAGNOSIS — I25.10 ATHEROSCLEROTIC HEART DISEASE OF NATIVE CORONARY ARTERY W/OUT ANGINA PECTORIS: ICD-10-CM

## 2025-03-25 DIAGNOSIS — E78.5 HYPERLIPIDEMIA, UNSPECIFIED: ICD-10-CM

## 2025-03-25 DIAGNOSIS — I10 ESSENTIAL (PRIMARY) HYPERTENSION: ICD-10-CM

## 2025-03-25 PROCEDURE — 95807 SLEEP STUDY ATTENDED: CPT | Mod: 26

## 2025-03-25 PROCEDURE — 95807 SLEEP STUDY ATTENDED: CPT

## 2025-03-25 PROCEDURE — 99214 OFFICE O/P EST MOD 30 MIN: CPT

## 2025-03-25 PROCEDURE — 93000 ELECTROCARDIOGRAM COMPLETE: CPT

## 2025-03-27 ENCOUNTER — NON-APPOINTMENT (OUTPATIENT)
Age: 80
End: 2025-03-27

## 2025-03-27 ENCOUNTER — APPOINTMENT (OUTPATIENT)
Dept: INTERNAL MEDICINE | Facility: CLINIC | Age: 80
End: 2025-03-27
Payer: MEDICARE

## 2025-03-27 ENCOUNTER — OUTPATIENT (OUTPATIENT)
Dept: OUTPATIENT SERVICES | Facility: HOSPITAL | Age: 80
LOS: 1 days | End: 2025-03-27
Payer: MEDICARE

## 2025-03-27 VITALS
WEIGHT: 194 LBS | SYSTOLIC BLOOD PRESSURE: 122 MMHG | OXYGEN SATURATION: 96 % | DIASTOLIC BLOOD PRESSURE: 60 MMHG | HEIGHT: 65 IN | BODY MASS INDEX: 32.32 KG/M2 | HEART RATE: 84 BPM

## 2025-03-27 DIAGNOSIS — I10 ESSENTIAL (PRIMARY) HYPERTENSION: ICD-10-CM

## 2025-03-27 DIAGNOSIS — Z01.818 ENCOUNTER FOR OTHER PREPROCEDURAL EXAMINATION: ICD-10-CM

## 2025-03-27 PROCEDURE — G0463: CPT

## 2025-03-27 PROCEDURE — G2211 COMPLEX E/M VISIT ADD ON: CPT

## 2025-03-27 PROCEDURE — 99214 OFFICE O/P EST MOD 30 MIN: CPT

## 2025-03-28 DIAGNOSIS — G47.33 OBSTRUCTIVE SLEEP APNEA (ADULT) (PEDIATRIC): ICD-10-CM

## 2025-03-31 ENCOUNTER — NON-APPOINTMENT (OUTPATIENT)
Age: 80
End: 2025-03-31

## 2025-04-01 DIAGNOSIS — Z01.818 ENCOUNTER FOR OTHER PREPROCEDURAL EXAMINATION: ICD-10-CM

## 2025-04-02 ENCOUNTER — EMERGENCY (EMERGENCY)
Facility: HOSPITAL | Age: 80
LOS: 1 days | Discharge: HOME | End: 2025-04-02
Attending: STUDENT IN AN ORGANIZED HEALTH CARE EDUCATION/TRAINING PROGRAM
Payer: MEDICARE

## 2025-04-02 VITALS
RESPIRATION RATE: 18 BRPM | DIASTOLIC BLOOD PRESSURE: 64 MMHG | HEART RATE: 79 BPM | OXYGEN SATURATION: 97 % | SYSTOLIC BLOOD PRESSURE: 134 MMHG

## 2025-04-02 VITALS
WEIGHT: 192.9 LBS | HEART RATE: 87 BPM | TEMPERATURE: 98 F | DIASTOLIC BLOOD PRESSURE: 60 MMHG | HEIGHT: 65 IN | RESPIRATION RATE: 18 BRPM | OXYGEN SATURATION: 96 % | SYSTOLIC BLOOD PRESSURE: 125 MMHG

## 2025-04-02 LAB
ALBUMIN SERPL ELPH-MCNC: 3.8 G/DL — SIGNIFICANT CHANGE UP (ref 3.3–5)
ALP SERPL-CCNC: 53 U/L — SIGNIFICANT CHANGE UP (ref 40–120)
ALT FLD-CCNC: 11 U/L — SIGNIFICANT CHANGE UP (ref 10–45)
ANION GAP SERPL CALC-SCNC: 14 MMOL/L — SIGNIFICANT CHANGE UP (ref 5–17)
APTT BLD: 27.1 SEC — SIGNIFICANT CHANGE UP (ref 24.5–35.6)
AST SERPL-CCNC: 13 U/L — SIGNIFICANT CHANGE UP (ref 10–40)
BACTERIA # UR AUTO: NEGATIVE /HPF — SIGNIFICANT CHANGE UP
BASOPHILS # BLD AUTO: 0.02 K/UL — SIGNIFICANT CHANGE UP (ref 0–0.2)
BASOPHILS NFR BLD AUTO: 0.2 % — SIGNIFICANT CHANGE UP (ref 0–2)
BILIRUB SERPL-MCNC: 0.5 MG/DL — SIGNIFICANT CHANGE UP (ref 0.2–1.2)
BILIRUB UR-MCNC: ABNORMAL
CALCIUM SERPL-MCNC: 9.4 MG/DL — SIGNIFICANT CHANGE UP (ref 8.4–10.5)
CAST: 0 /LPF — SIGNIFICANT CHANGE UP (ref 0–4)
CHLORIDE SERPL-SCNC: 98 MMOL/L — SIGNIFICANT CHANGE UP (ref 96–108)
CO2 SERPL-SCNC: 22 MMOL/L — SIGNIFICANT CHANGE UP (ref 22–31)
COLOR SPEC: ABNORMAL
CREAT SERPL-MCNC: 0.69 MG/DL — SIGNIFICANT CHANGE UP (ref 0.5–1.3)
DIFF PNL FLD: ABNORMAL
EGFR: 93 ML/MIN/1.73M2 — SIGNIFICANT CHANGE UP
EGFR: 93 ML/MIN/1.73M2 — SIGNIFICANT CHANGE UP
EOSINOPHIL # BLD AUTO: 0 K/UL — SIGNIFICANT CHANGE UP (ref 0–0.5)
EOSINOPHIL NFR BLD AUTO: 0 % — SIGNIFICANT CHANGE UP (ref 0–6)
GLUCOSE SERPL-MCNC: 167 MG/DL — HIGH (ref 70–99)
GLUCOSE UR QL: NEGATIVE MG/DL — SIGNIFICANT CHANGE UP
HCT VFR BLD CALC: 32.5 % — LOW (ref 39–50)
HGB BLD-MCNC: 11.2 G/DL — LOW (ref 13–17)
IMM GRANULOCYTES NFR BLD AUTO: 0.4 % — SIGNIFICANT CHANGE UP (ref 0–0.9)
INR BLD: 1.24 RATIO — HIGH (ref 0.85–1.16)
KETONES UR-MCNC: NEGATIVE MG/DL — SIGNIFICANT CHANGE UP
LEUKOCYTE ESTERASE UR-ACNC: ABNORMAL
LYMPHOCYTES # BLD AUTO: 0.55 K/UL — LOW (ref 1–3.3)
LYMPHOCYTES # BLD AUTO: 6.2 % — LOW (ref 13–44)
MCHC RBC-ENTMCNC: 29.7 PG — SIGNIFICANT CHANGE UP (ref 27–34)
MCHC RBC-ENTMCNC: 34.5 G/DL — SIGNIFICANT CHANGE UP (ref 32–36)
MCV RBC AUTO: 86.2 FL — SIGNIFICANT CHANGE UP (ref 80–100)
MONOCYTES # BLD AUTO: 0.6 K/UL — SIGNIFICANT CHANGE UP (ref 0–0.9)
MONOCYTES NFR BLD AUTO: 6.7 % — SIGNIFICANT CHANGE UP (ref 2–14)
NEUTROPHILS # BLD AUTO: 7.72 K/UL — HIGH (ref 1.8–7.4)
NEUTROPHILS NFR BLD AUTO: 86.5 % — HIGH (ref 43–77)
NITRITE UR-MCNC: NEGATIVE — SIGNIFICANT CHANGE UP
NRBC BLD AUTO-RTO: 0 /100 WBCS — SIGNIFICANT CHANGE UP (ref 0–0)
PH UR: 5 — SIGNIFICANT CHANGE UP (ref 5–8)
PLATELET # BLD AUTO: 182 K/UL — SIGNIFICANT CHANGE UP (ref 150–400)
POTASSIUM SERPL-MCNC: 4 MMOL/L — SIGNIFICANT CHANGE UP (ref 3.5–5.3)
POTASSIUM SERPL-SCNC: 4 MMOL/L — SIGNIFICANT CHANGE UP (ref 3.5–5.3)
PROT SERPL-MCNC: 6.6 G/DL — SIGNIFICANT CHANGE UP (ref 6–8.3)
PROTHROM AB SERPL-ACNC: 14.2 SEC — HIGH (ref 9.9–13.4)
RBC # BLD: 3.77 M/UL — LOW (ref 4.2–5.8)
RBC # FLD: 13.3 % — SIGNIFICANT CHANGE UP (ref 10.3–14.5)
RBC CASTS # UR COMP ASSIST: 569 /HPF — HIGH (ref 0–4)
SODIUM SERPL-SCNC: 134 MMOL/L — LOW (ref 135–145)
SP GR SPEC: 1.01 — SIGNIFICANT CHANGE UP (ref 1–1.03)
SQUAMOUS # UR AUTO: 2 /HPF — SIGNIFICANT CHANGE UP (ref 0–5)
UROBILINOGEN FLD QL: 0.2 MG/DL — SIGNIFICANT CHANGE UP (ref 0.2–1)
WBC # BLD: 8.93 K/UL — SIGNIFICANT CHANGE UP (ref 3.8–10.5)
WBC # FLD AUTO: 8.93 K/UL — SIGNIFICANT CHANGE UP (ref 3.8–10.5)
WBC UR QL: 14 /HPF — HIGH (ref 0–5)

## 2025-04-02 PROCEDURE — 99285 EMERGENCY DEPT VISIT HI MDM: CPT | Mod: FS

## 2025-04-02 PROCEDURE — 87086 URINE CULTURE/COLONY COUNT: CPT

## 2025-04-02 PROCEDURE — 36415 COLL VENOUS BLD VENIPUNCTURE: CPT

## 2025-04-02 PROCEDURE — 85025 COMPLETE CBC W/AUTO DIFF WBC: CPT

## 2025-04-02 PROCEDURE — 85730 THROMBOPLASTIN TIME PARTIAL: CPT

## 2025-04-02 PROCEDURE — 99284 EMERGENCY DEPT VISIT MOD MDM: CPT

## 2025-04-02 PROCEDURE — 86901 BLOOD TYPING SEROLOGIC RH(D): CPT

## 2025-04-02 PROCEDURE — 80053 COMPREHEN METABOLIC PANEL: CPT

## 2025-04-02 PROCEDURE — 86850 RBC ANTIBODY SCREEN: CPT

## 2025-04-02 PROCEDURE — 85610 PROTHROMBIN TIME: CPT

## 2025-04-02 PROCEDURE — 81001 URINALYSIS AUTO W/SCOPE: CPT

## 2025-04-02 PROCEDURE — 86900 BLOOD TYPING SEROLOGIC ABO: CPT

## 2025-04-02 NOTE — ED PROVIDER NOTE - PHYSICAL EXAMINATION
: winter catheter in place. no leaking noted. no swelling or erythema noted to penis.   bag noted to have BRB. no clots noted.

## 2025-04-02 NOTE — ED ADULT TRIAGE NOTE - HEIGHT IN FEET
Left message for patient to call back.   Needs to be notified that his bako toenail results are negative for fungus.   Positive for microtrauma        5

## 2025-04-02 NOTE — ED PROVIDER NOTE - OBJECTIVE STATEMENT
79 y/o male, hx of HTN, HLD, DM, takes baby aspirin but stopped prior to procedure, presents to the ER for hematuria. wife states noticed patient was not urinating as much as normal. had imaging done and was found to have 1.5cm left sided kidney stone. had a stent placed yesterday for the stone at [facility]. wife states when they left patient had some hematuria but was told this is normal post procedure. states overnight had to change the Hawthorne bag multiple times. patient states other than hematuria, feels well. denies f/n/v/d, CP, SOB, HA, dizziness, abdominal pain, dysuria.

## 2025-04-02 NOTE — CONSULT NOTE ADULT - ASSESSMENT
81 yo male s/p Ureteroscopy and stent placement 4/1/25 by Dr Goldberg    - OK to d/c home  - F/U with Dr Goldberg on friday 4/4/25 - pt will need to call the office.     Dr Goldberg  [phone]

## 2025-04-02 NOTE — ED PROVIDER NOTE - PROGRESS NOTE DETAILS
Urology consulted, aware and will see patient in the ED. -Clara Garcia MD (Attending) Urology cleared pt for dc. Recommending f/u appt for 4/4 instead of scheduled appt tomorrow on 04/03. Wife and pt informed and agree with plan. Copy of results given. Patient stable for discharge.  Follow up instructions given, return to ED precautions reviewed. Importance of follow up emphasized, patient verbalized understanding.  All questions answered. Anurag Ellington PA-C

## 2025-04-02 NOTE — ED ADULT NURSE NOTE - DRUG PRE-SCREENING (DAST -1)
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 She had a Cardiomems device placed 3/8/19 with Dr. Francis at [facility]. She was recently diagnosed with IGG multiple myeloma (congo red negative for amyloid) by Dr. Ruiz and was started on Solumedrol 1x week. Also of note, had +GGT, and elevated CRP and was recommended by GI (Dr. Monaco) to get an endoscopy which is pending for Cardiac clearance.  She was recently seen by Dr. Ruiz from [facility] and her Myeloma is stable and she continues to take Decadron 1x a week and Ninlaro for 3 weeks/off one week. \par \par Since our last telehealth visit 6/5/20, she was hospitalized at [facility] from 8/9-8/12 for acute decompensated HF with 1 week of worsening SOB, 5 pillow orthopnea, PND and RUPINDER. (HF team NOT notified). Before presenting to the hospital, she doubled her Torsemide on her own, but still noticed decreased urine output and didn't respond. During her hospitalization, she had a TTE done showing LVEF 30% with elevated filling pressures, she was was diuresed with IV lasix and transitioned to Torsemide 40mg PO on DC and her Toprol XL was decreased in half. \par \par Since being discharged, she continues to feel lousy. She still has b/l RUPINDER. Her breathing is better than before the hospital but still has some RAMSEY. She has not been doing her Cardiomems readings 2/2 being busy but will do it going forward. Currently, she denies CP,SOB at rest, orthopnea, PND, LH/dizziness,palpitations and syncope. Her appetite is normal and bowel and bladder habits are unchanged. She has been limiting fluid and sodium intake and taking medications as directed. She does not use NSAIDS and has not had any ICD shocks. \par \par \par  Statement Selected

## 2025-04-02 NOTE — ED PROVIDER NOTE - PATIENT PORTAL LINK FT
You can access the FollowMyHealth Patient Portal offered by [facility] by registering at the following website: [email or web address]. By joining [facility]’s FollowMyHealth portal, you will also be able to view your health information using other applications (apps) compatible with our system.

## 2025-04-02 NOTE — ED ADULT NURSE REASSESSMENT NOTE - NS ED NURSE REASSESS COMMENT FT1
Per urology recs and PA, pt stable for dc, 150cc bloody urine drained from winter, no clots visualized. Pt denies abd/suprapubic pain or pressure.

## 2025-04-02 NOTE — ED ADULT NURSE REASSESSMENT NOTE - NS ED NURSE REASSESS COMMENT FT1
Report received from RN Ellie, pt A+Ox4, breathing spontaneous and unlabored, patton and following commands, winter catheter clean dry and intact, draining to gravity, 700 cc of bloody urine drained no clots noted. Pt denies any abd/suprapubic pain or pressure, pending urology.

## 2025-04-02 NOTE — ED PROVIDER NOTE - CLINICAL SUMMARY MEDICAL DECISION MAKING FREE TEXT BOX
I was the supervising attending. I have independently seen face-to-face and examined the patient in conjunction with the JENNIFER. I have reviewed the history and physical and discussed the MDM with the JENNIFER. I agree with the assessment and plan as presented unless otherwise documented as follows:     80M hx DM, HTN, HLD, on ASA presenting w/ hematuria. Of note, patient had procedure at [facility] on 4/1/2025 with Dr. Gary Goldberg for L ureteral stent placement for 1.5cm obstructing ureteral stone. Was d/c'ed from hospital at approximately 6PM. Noted hematuria since returning from OR which he was told was normal but to go to the hospital if passage of blood clots. Noted one blood clot in Hawthorne while at home. Patient and wife were concerned about continued hematuria so came to the hospital. Otherwise denies fevers/chills, nausea/vomiting, flank/abdominal pain, abdominal distention. Appears well, AOx3, not in acute distress. Lungs CTABL, normal heart sounds, abdomen soft and NT/ND. Hawthorne bag w/ gross hematuria, no obvious clots. Will obtain labs, UA. Will consult urology. -Clara Garcia MD (Attending) I was the supervising attending. I have independently seen face-to-face and examined the patient in conjunction with the JENNIFER. I have reviewed the history and physical and discussed the MDM with the JENNIFER. I agree with the assessment and plan as presented unless otherwise documented as follows:     80M hx DM, HTN, HLD, on ASA presenting w/ hematuria. Of note, patient had procedure at [facility] on 4/1/2025 with Dr. Gary Goldberg for L ureteral stent placement for 1.5cm obstructing ureteral stone. Was d/c'ed from hospital at approximately 6PM. Noted hematuria since returning from OR which he was told was normal but to go to the hospital if passage of blood clots. Noted one blood clot in Hawthorne while at home. Patient and wife were concerned about continued hematuria so came to the hospital. Otherwise denies fevers/chills, nausea/vomiting, flank/abdominal pain, abdominal distention. Appears well, AOx3, not in acute distress. Lungs CTABL, normal heart sounds, abdomen soft and NT/ND. Hawthorne bag w/ gross hematuria, no obvious clots. Suspect hematuria most likely i/s/o very recent stent placement. On baby ASA only, no additional AC/AP to increase risk of pathologic bleeding but will assess coags and H/H. Presentation not suggestive of urinary retention. Patient very comfortable appearing at this time. Will obtain labs, UA. Will consult urology. -Clara Garcia MD (Attending)

## 2025-04-02 NOTE — CONSULT NOTE ADULT - SUBJECTIVE AND OBJECTIVE BOX
HPI:  80y Male with PMHx HTN, HLP, DM presents to the ER with cc of hematuria. PT is s/p ureteroscopy and stent placement yesterday by Dr Goldberg at [facility]. Pt's urine is watermelon colored. PT denies any AC. PT was brought into the ER by family due to hematuria concern.     Pt denies any  nausea, vomiting, or diarrhea. There has been no fever, chills, or documented temperature.      PAST MEDICAL & SURGICAL HISTORY:  Essential hypertension      Obesity      HLD (hyperlipidemia)      Former smoker      KAIDEN (obstructive sleep apnea)      No significant past surgical history        FAMILY HISTORY:  Family history of heart disease (Father)    No known  malignancy     SOCIAL HISTORY  Denies alcohol and drug abuse, nonsmoker     MEDICATIONS  (STANDING):    MEDICATIONS  (PRN):    Allergies    No Known Allergies    Intolerances      REVIEW OF SYSTEMS: Pertinent positives and negatives as stated in HPI, otherwise negative    Vital signs  T(C): 36.9 (04-02-25 @ 07:45), Max: 36.9 (04-02-25 @ 07:45)  HR: 72 (04-02-25 @ 07:45)  BP: 127/65 (04-02-25 @ 07:45)  SpO2: 99% (04-02-25 @ 07:45)  Wt(kg): --    Physical Exam  Gen: NAD  HEENT: normocephalic, atraumatic, no scleral icterus  Pulm: No respiratory distress, no subcostal retractions, no accessory muscle use   CV: RRR,  no JVD  Abd: Soft, NT, ND, no rebound tenderness or guarding  : 16F [place] in place. Irrigated with 200 cc of NS. no clots. Pt's urine is light watermelon color.   MSK:  No CVAT, Moving all extremities, full ROM in all extremities, No edema present  NEURO: A&Ox3, no focal neurological deficits, CN 2-12 grossly intact  SKIN: warm, dry, no rash.    LABS:  CBC                       11.2   8.93  )-----------( 182      ( 02 Apr 2025 06:41 )             32.5     BMP   04-02    134[L]  |  98  |  15  ----------------------------<  167[H]  4.0   |  22  |  0.69    Ca    9.4      02 Apr 2025 06:41    TPro  6.6  /  Alb  3.8  /  TBili  0.5  /  DBili  x   /  AST  13  /  ALT  11  /  AlkPhos  53  04-02    PT/INR - ( 02 Apr 2025 06:41 )   PT: 14.2 sec;   INR: 1.24 ratio         PTT - ( 02 Apr 2025 06:41 )  PTT:27.1 sec    Urinalysis Basic - ( 02 Apr 2025 06:41 )    Color: x / Appearance: x / SG: x / pH: x  Gluc: 167 mg/dL / Ketone: x  / Bili: x / Urobili: x   Blood: x / Protein: x / Nitrite: x   Leuk Esterase: x / RBC: x / WBC x   Sq Epi: x / Non Sq Epi: x / Bacteria: x

## 2025-04-02 NOTE — ED ADULT NURSE NOTE - OBJECTIVE STATEMENT
79 y/o M with PMHx of HTN, DM, HLD, spinal stenosis presents to the ED brought in by EMS with complaints of hematuria. Per EMS, patient had recent kidney stone removal at [facility] for which was discharged home last night. Per wife at bedside, patient has had blood in leg bag since discharge home. Wife is unsure of kidney stone procedure. Per wife, clot was noted in leg bag thus became concerned and presented to the ED. Patient with complaints of discomfort at insertion site. AOx4 and speaking coherently. Breathing is unlabored, spontaneous, and symmetrical. Abdomen is soft, nondistended. No bladder distention.  <2s capillary refill. Bright red blood noted in patient leg bag. No clots visualized at this time.

## 2025-04-02 NOTE — ED PROVIDER NOTE - NSFOLLOWUPINSTRUCTIONS_ED_ALL_ED_FT
Please make sure to follow up with your primary care doctor within 1-2 days and with the UROLOGY specialist. The information for follow up can be found below. Bring a copy of all of your results with you to your follow up appointments.   Return to the ER as discussed if you develop any new or worsening symptoms.     PLEASE CALL AND MAKE AN APPOINTMENT TO SEE THE UROLOGIST ON 04/04/25.      Gary D. Goldberg, MD, [facility]  Urologist in [place], [place]  Address: [address] [place] [address], [place] [address]  Phone: [phone]

## 2025-04-03 LAB
CULTURE RESULTS: NO GROWTH — SIGNIFICANT CHANGE UP
SPECIMEN SOURCE: SIGNIFICANT CHANGE UP

## 2025-04-05 ENCOUNTER — EMERGENCY (EMERGENCY)
Facility: HOSPITAL | Age: 80
LOS: 1 days | End: 2025-04-05
Attending: EMERGENCY MEDICINE
Payer: MEDICARE

## 2025-04-05 ENCOUNTER — APPOINTMENT (OUTPATIENT)
Dept: MRI IMAGING | Facility: CLINIC | Age: 80
End: 2025-04-05

## 2025-04-05 VITALS
SYSTOLIC BLOOD PRESSURE: 134 MMHG | HEIGHT: 65 IN | TEMPERATURE: 99 F | WEIGHT: 190.92 LBS | OXYGEN SATURATION: 96 % | RESPIRATION RATE: 15 BRPM | DIASTOLIC BLOOD PRESSURE: 69 MMHG | HEART RATE: 98 BPM

## 2025-04-05 VITALS
HEART RATE: 78 BPM | RESPIRATION RATE: 16 BRPM | OXYGEN SATURATION: 97 % | SYSTOLIC BLOOD PRESSURE: 128 MMHG | DIASTOLIC BLOOD PRESSURE: 60 MMHG

## 2025-04-05 LAB
ALBUMIN SERPL ELPH-MCNC: 4 G/DL — SIGNIFICANT CHANGE UP (ref 3.3–5)
ALP SERPL-CCNC: 55 U/L — SIGNIFICANT CHANGE UP (ref 40–120)
ALT FLD-CCNC: 15 U/L — SIGNIFICANT CHANGE UP (ref 10–45)
ANION GAP SERPL CALC-SCNC: 14 MMOL/L — SIGNIFICANT CHANGE UP (ref 5–17)
APPEARANCE UR: ABNORMAL
AST SERPL-CCNC: 13 U/L — SIGNIFICANT CHANGE UP (ref 10–40)
BACTERIA # UR AUTO: NEGATIVE /HPF — SIGNIFICANT CHANGE UP
BASOPHILS # BLD AUTO: 0 K/UL — SIGNIFICANT CHANGE UP (ref 0–0.2)
BASOPHILS NFR BLD AUTO: 0 % — SIGNIFICANT CHANGE UP (ref 0–2)
BILIRUB SERPL-MCNC: 0.8 MG/DL — SIGNIFICANT CHANGE UP (ref 0.2–1.2)
BILIRUB UR-MCNC: ABNORMAL
BUN SERPL-MCNC: 14 MG/DL — SIGNIFICANT CHANGE UP (ref 7–23)
CALCIUM SERPL-MCNC: 9.4 MG/DL — SIGNIFICANT CHANGE UP (ref 8.4–10.5)
CAST: 0 /LPF — SIGNIFICANT CHANGE UP (ref 0–4)
CHLORIDE SERPL-SCNC: 99 MMOL/L — SIGNIFICANT CHANGE UP (ref 96–108)
CO2 SERPL-SCNC: 23 MMOL/L — SIGNIFICANT CHANGE UP (ref 22–31)
COLOR SPEC: ABNORMAL
CREAT SERPL-MCNC: 0.66 MG/DL — SIGNIFICANT CHANGE UP (ref 0.5–1.3)
DIFF PNL FLD: ABNORMAL
EGFR: 94 ML/MIN/1.73M2 — SIGNIFICANT CHANGE UP
EGFR: 94 ML/MIN/1.73M2 — SIGNIFICANT CHANGE UP
EOSINOPHIL # BLD AUTO: 0 K/UL — SIGNIFICANT CHANGE UP (ref 0–0.5)
EOSINOPHIL NFR BLD AUTO: 0 % — SIGNIFICANT CHANGE UP (ref 0–6)
GIANT PLATELETS BLD QL SMEAR: PRESENT — SIGNIFICANT CHANGE UP
GLUCOSE SERPL-MCNC: 122 MG/DL — HIGH (ref 70–99)
GLUCOSE UR QL: NEGATIVE MG/DL — SIGNIFICANT CHANGE UP
HCT VFR BLD CALC: 33.5 % — LOW (ref 39–50)
HGB BLD-MCNC: 11.7 G/DL — LOW (ref 13–17)
KETONES UR-MCNC: NEGATIVE MG/DL — SIGNIFICANT CHANGE UP
LEUKOCYTE ESTERASE UR-ACNC: ABNORMAL
LYMPHOCYTES # BLD AUTO: 0.56 K/UL — LOW (ref 1–3.3)
LYMPHOCYTES # BLD AUTO: 12.2 % — LOW (ref 13–44)
MANUAL SMEAR VERIFICATION: SIGNIFICANT CHANGE UP
MCHC RBC-ENTMCNC: 30.1 PG — SIGNIFICANT CHANGE UP (ref 27–34)
MCHC RBC-ENTMCNC: 34.9 G/DL — SIGNIFICANT CHANGE UP (ref 32–36)
MCV RBC AUTO: 86.1 FL — SIGNIFICANT CHANGE UP (ref 80–100)
MONOCYTES # BLD AUTO: 0.48 K/UL — SIGNIFICANT CHANGE UP (ref 0–0.9)
MONOCYTES NFR BLD AUTO: 10.4 % — SIGNIFICANT CHANGE UP (ref 2–14)
NEUTROPHILS # BLD AUTO: 3.54 K/UL — SIGNIFICANT CHANGE UP (ref 1.8–7.4)
NEUTROPHILS NFR BLD AUTO: 77.4 % — HIGH (ref 43–77)
NITRITE UR-MCNC: POSITIVE
PH UR: 7 — SIGNIFICANT CHANGE UP (ref 5–8)
PLAT MORPH BLD: NORMAL — SIGNIFICANT CHANGE UP
PLATELET # BLD AUTO: 178 K/UL — SIGNIFICANT CHANGE UP (ref 150–400)
POTASSIUM SERPL-MCNC: 3.6 MMOL/L — SIGNIFICANT CHANGE UP (ref 3.5–5.3)
POTASSIUM SERPL-SCNC: 3.6 MMOL/L — SIGNIFICANT CHANGE UP (ref 3.5–5.3)
PROT SERPL-MCNC: 7.2 G/DL — SIGNIFICANT CHANGE UP (ref 6–8.3)
PROT UR-MCNC: 300 MG/DL
RBC # BLD: 3.89 M/UL — LOW (ref 4.2–5.8)
RBC # FLD: 13.5 % — SIGNIFICANT CHANGE UP (ref 10.3–14.5)
RBC BLD AUTO: SIGNIFICANT CHANGE UP
RBC CASTS # UR COMP ASSIST: >1900 /HPF — HIGH (ref 0–4)
REVIEW: SIGNIFICANT CHANGE UP
SODIUM SERPL-SCNC: 136 MMOL/L — SIGNIFICANT CHANGE UP (ref 135–145)
SP GR SPEC: 1.01 — SIGNIFICANT CHANGE UP (ref 1–1.03)
SQUAMOUS # UR AUTO: 5 /HPF — SIGNIFICANT CHANGE UP (ref 0–5)
UROBILINOGEN FLD QL: 0.2 MG/DL — SIGNIFICANT CHANGE UP (ref 0.2–1)
WBC # BLD: 4.58 K/UL — SIGNIFICANT CHANGE UP (ref 3.8–10.5)
WBC # FLD AUTO: 4.58 K/UL — SIGNIFICANT CHANGE UP (ref 3.8–10.5)
WBC UR QL: 31 /HPF — HIGH (ref 0–5)

## 2025-04-05 PROCEDURE — 85025 COMPLETE CBC W/AUTO DIFF WBC: CPT

## 2025-04-05 PROCEDURE — 99284 EMERGENCY DEPT VISIT MOD MDM: CPT | Mod: 25

## 2025-04-05 PROCEDURE — 80053 COMPREHEN METABOLIC PANEL: CPT

## 2025-04-05 PROCEDURE — 96374 THER/PROPH/DIAG INJ IV PUSH: CPT | Mod: XU

## 2025-04-05 PROCEDURE — 99284 EMERGENCY DEPT VISIT MOD MDM: CPT | Mod: GC

## 2025-04-05 PROCEDURE — 51702 INSERT TEMP BLADDER CATH: CPT

## 2025-04-05 PROCEDURE — 99283 EMERGENCY DEPT VISIT LOW MDM: CPT

## 2025-04-05 PROCEDURE — 87086 URINE CULTURE/COLONY COUNT: CPT

## 2025-04-05 PROCEDURE — 81001 URINALYSIS AUTO W/SCOPE: CPT

## 2025-04-05 RX ORDER — LIDOCAINE HCL/PF 10 MG/ML
10 VIAL (ML) INJECTION ONCE
Refills: 0 | Status: COMPLETED | OUTPATIENT
Start: 2025-04-05 | End: 2025-04-05

## 2025-04-05 RX ORDER — CEFPODOXIME PROXETIL 200 MG/1
1 TABLET, FILM COATED ORAL
Qty: 20 | Refills: 0
Start: 2025-04-05 | End: 2025-04-14

## 2025-04-05 RX ORDER — CEFTRIAXONE 500 MG/1
1000 INJECTION, POWDER, FOR SOLUTION INTRAMUSCULAR; INTRAVENOUS ONCE
Refills: 0 | Status: COMPLETED | OUTPATIENT
Start: 2025-04-05 | End: 2025-04-05

## 2025-04-05 RX ADMIN — Medication 10 MILLILITER(S): at 08:58

## 2025-04-05 RX ADMIN — CEFTRIAXONE 100 MILLIGRAM(S): 500 INJECTION, POWDER, FOR SOLUTION INTRAMUSCULAR; INTRAVENOUS at 11:13

## 2025-04-05 NOTE — ED ADULT NURSE NOTE - OBJECTIVE STATEMENT
80 y male presents of complaints of hematuria since early this morning around 3 am. Pt states had indwelling Hawthorne catheter in place this morning that wife states came out in the shower. Pt has been passing clots,  unable to develop urinary stream. Bladder scan shows 470 cc of urine. Denies headache, dizziness, vision changes, chest pain, shortness of breath, nausea, vomiting, diarrhea, fevers, chills,  recent illness travel or fall.

## 2025-04-05 NOTE — ED PROVIDER NOTE - NSFOLLOWUPCLINICS_GEN_ALL_ED_FT
[place] [facility]  Urology  [address] [place] [address]  Phone: [phone]  Fax:

## 2025-04-05 NOTE — ED ADULT NURSE REASSESSMENT NOTE - NS ED NURSE REASSESS COMMENT FT1
Size 26 FR Hawthorne catheter inserted under sterile technique with 2 RN's at the bedside as per MD Hoyt order. 26 Japanese placed per MD Hoyt Request PT tolerated well. Approximately 400 mL Javon red urine drained. UA/UC sent as per MD order. Safety and comfort maintained. Call bell within reach.

## 2025-04-05 NOTE — ED PROVIDER NOTE - ATTENDING CONTRIBUTION TO CARE
Patient recently underwent procedure by urology and now cannot urinate  Suprapubic fullness and a bladder scan greater than 500  No abdominal tenderness  Will place Hawthorne catheter and get in touch with urology regarding plan of care.  Patient is clinically well.

## 2025-04-05 NOTE — ED PROVIDER NOTE - CLINICAL SUMMARY MEDICAL DECISION MAKING FREE TEXT BOX
Summary : 80-year-old male, presents with hematuria and abdominal pain. He has a recent surgical history of stone removal and temporary placement of a winter which dislodged itself. He now has a persisting stent and has recurrent episodes of severe abdominal pain associated with blood clots in his urine. PMH of KAIDEN, preDM, HTN  PE: NAD, MMM, lungs CTA, heart no MRG, no abdominal tenderness, suprapubic fullness on exam, no CVA tenderness, no LE edema  DDx: I think this was urinary retention due to to a blood clot stuck in the urethra, UTI issue with urinary stent,     Plan: Placement of a three-way urinary catheter, urology consult Summary : 80-year-old male, presents with hematuria and abdominal pain. He has a recent surgical history of stone removal and temporary placement of a winter which dislodged itself. He now has a persisting stent and has recurrent episodes of severe abdominal pain associated with blood clots in his urine. PMH of KAIDEN, preDM, HTN  PE: NAD, MMM, lungs CTA, heart no MRG, no abdominal tenderness, suprapubic fullness on exam, no CVA tenderness, no LE edema  DDx: I think this was urinary retention due to to a blood clot stuck in the urethra, UTI issue with urinary stent,     Plan: Placement of a three-way urinary catheter, urology consult, UA, cbc, cmp

## 2025-04-05 NOTE — ED PROVIDER NOTE - NSFOLLOWUPINSTRUCTIONS_ED_ALL_ED_FT
FOLLOW UP WITH YOUR UROLOGIST   Urinary Tract Infection    A urinary tract infection (UTI) is an infection of any part of the urinary tract, which includes the kidneys, ureters, bladder, and urethra. Risk factors include ignoring your need to urinate, wiping back to front if female, being an uncircumcised male, and having diabetes or a weak immune system. Symptoms include frequent urination, pain or burning with urination, foul smelling urine, cloudy urine, pain in the lower abdomen, blood in the urine, and fever. If you were prescribed an antibiotic medicine, take it as told by your health care provider. Do not stop taking the antibiotic even if you start to feel better.    SEEK IMMEDIATE MEDICAL CARE IF YOU HAVE ANY OF THE FOLLOWING SYMPTOMS: severe back or abdominal pain, fever, inability to keep fluids or medicine down, dizziness/lightheadedness, or a change in mental status.

## 2025-04-05 NOTE — ED ADULT NURSE NOTE - CAS EDP DISCH TYPE
Quality 402: Tobacco Use And Help With Quitting Among Adolescents: Patient screened for tobacco and is an ex-smoker
Quality 394c: Hpv Vaccine For Adolescents: Patient did not have at least two HPV vaccines (with at least 146 days between the two) OR three HPV vaccines on or between the patient’s 9th and 13th birthdays.
Detail Level: Detailed
Quality 110: Preventive Care And Screening: Influenza Immunization: Influenza Immunization Administered during Influenza season
Quality 394a: Meningococcal Immunizations For Adolescents: Patient did not have one dose of meningococcal vaccine on or between the patient's 11th and 13th birthdays.
Quality 47: Advance Care Plan: Advance Care Planning discussed and documented in the medical record; patient did not wish or was not able to name a surrogate decision maker or provide an advance care plan.
Quality 431: Preventive Care And Screening: Unhealthy Alcohol Use - Screening: Patient not identified as an unhealthy alcohol user when screened for unhealthy alcohol use using a systematic screening method
Quality 394b: Td/Tdap Immunizations For Adolescents: Patient did not have one Tdap or one Td vaccine on or between the patient's 10th and 13th birthdays.
Quality 226: Preventive Care And Screening: Tobacco Use: Screening And Cessation Intervention: Patient screened for tobacco use and is an ex/non-smoker
Quality 111:Pneumonia Vaccination Status For Older Adults: Patient received any pneumococcal conjugate or polysaccharide vaccine on or after their 60th birthday and before the end of the measurement period
Quality 130: Documentation Of Current Medications In The Medical Record: Current Medications Documented
Home

## 2025-04-05 NOTE — ED PROVIDER NOTE - OBJECTIVE STATEMENT
- Chief Complaint (CC) : primarily reports hematuria and abdominal pain.  - History of Present Illness : arrived complaining of widespread abdominal pain which he rates as a 9 out of 10 in severity. He reports the pain is most severe while urinating and describes it as a burning sensation inside. The discomfort reportedly subsides after urination. He has been experiencing these symptoms since his recent surgery for stone removal. He had complications with the winter catheter which eventually came off on its own. Currently, he has a stent in place in upper urinary tract. Blood clots in the urine have been noted.   - Past Medical History : has a documented history of obstructive sleep apnea, hyperlipidemia, obesity, and essential hypertension. He is a former smoker. He has been diagnosed as borderline diabetic and is on Metformin. No cardiac conditions were reported  - Past Surgical History : He recently had a surgical procedure for stone removal. Following which he experienced complications with the winter catheter. Currently, he has a stent placed on the left side.  - Family History : Family medical history was not discussed during the conversation.  - Social History :  - Former smoker  - Takes Metformin for borderline diabetes  - Review of Systems :  No fevers, chills, nausea, vomiting, shortness of breath or chest pain, no back pain    - lisinopril    - aspirin    - amlodipine    - metformin    - Allergies : No known allergies were reported.

## 2025-04-05 NOTE — CONSULT NOTE ADULT - ASSESSMENT
79yo male h/o nephrolithiasis s/p recent 4/1 URS, stent with Dr. Goldberg, presenting to ED with gross hematuria, retention   - 26f 3 way placed by ED staff, draining watermelon , no clots with irrigation currently   - continue winter, increase hydration   - UA noted concern for UTI, would start ABX   - f.u urine cx   - no need for CBI at this time, may be discharged with winter to legbag   - dispo per ED   - discussed with Dr. Wilson

## 2025-04-05 NOTE — ED PROVIDER NOTE - PATIENT PORTAL LINK FT
You can access the FollowMyHealth Patient Portal offered by [facility] by registering at the following website: [email or web address]. By joining [email or web address]’s FollowMyHealth portal, you will also be able to view your health information using other applications (apps) compatible with our system.

## 2025-04-05 NOTE — ED ADULT NURSE NOTE - NSFALLHARMRISKINTERV_ED_ALL_ED

## 2025-04-05 NOTE — ED PROVIDER NOTE - PHYSICAL EXAMINATION
Vital signs reviewed.  CONSTITUTIONAL: breathing  HEAD: Normocephalic; atraumatic  EYES: EOMI, PERRL, no conjunctival injection, no scleral icterus  MOUTH/THROAT:  MMM  NECK: Trachea midline, no JVD  CV: Normal S1, S2; no audible murmurs  RESP: normal work of breathing, CTA b/l  ABD: soft, non-distended; pressure to palpation in focal suprapubic region   : dark red blood around urethral meatus, cahperoned by ANTOINETTE Pendleton. We placed a three way urinary catheter and blod clots and red urine came out without pain   MSK/EXT: no LE edema, no limited ROM  SKIN: No rashes on exposed skin surfaces  NEURO: Moves all extremities spontaneously with no focal deficits, speech is appropriate  PSYCH: calm

## 2025-04-05 NOTE — CONSULT NOTE ADULT - SUBJECTIVE AND OBJECTIVE BOX
HPI:  80y Male with PMHx HTN, HLP, DM presents to the ER with cc of abdominal pain & gross hematuria. PT is s/p ureteroscopy and stent placement 4/1 by Dr Goldberg at [facility].   Post op was sent home with winter, which fell out on its own 1 day post op. went to ED, was urinating well at the time with pink urine so did not have it replaced.   Pt now returning to the ED because urine became more dark and with abdominal pain/difficulty voiding. ED bladder scanned patient, retaining close to 500cc. 26f 3 way placed by ED staff. clot irrigated out, urine then a translucent watermelon color.     Pt denies any fevers/chills. Abdominal pain now resolved. winter discomfort.     PAST MEDICAL & SURGICAL HISTORY:  Essential hypertension      Obesity      HLD (hyperlipidemia)      Former smoker      KAIDEN (obstructive sleep apnea)      No significant past surgical history        FAMILY HISTORY:  Family history of heart disease (Father)      SOCIAL HISTORY:   Tobacco hx:    MEDICATIONS  (STANDING):  cefTRIAXone   IVPB 1000 milliGRAM(s) IV Intermittent once    MEDICATIONS  (PRN):    Allergies    No Known Allergies    Intolerances        REVIEW OF SYSTEMS: Pertinent positives and negatives as stated in HPI, otherwise negative    Vital signs  T(C): 37.1 (04-05-25 @ 07:33), Max: 37.1 (04-05-25 @ 07:33)  HR: 98 (04-05-25 @ 07:33)  BP: 134/69 (04-05-25 @ 07:33)  SpO2: 96% (04-05-25 @ 07:33)  Wt(kg): --    Output    UOP    Physical Exam  Gen: NAD  Pulm: No respiratory distress, no subcostal retractions  CV:  no JVD  Abd: Soft, NT, ND  : +winter draining watermelon translucent urine. irrigated, no clots.    LABS:          04-05 @ 10:05    WBC 4.58  / Hct 33.5  / SCr 0.66     04-05    136  |  99  |  14  ----------------------------<  122[H]  3.6   |  23  |  0.66    Ca    9.4      05 Apr 2025 10:05    TPro  7.2  /  Alb  4.0  /  TBili  0.8  /  DBili  x   /  AST  13  /  ALT  15  /  AlkPhos  55  04-05      Urinalysis Basic - ( 05 Apr 2025 10:05 )    Color: x / Appearance: x / SG: x / pH: x  Gluc: 122 mg/dL / Ketone: x  / Bili: x / Urobili: x   Blood: x / Protein: x / Nitrite: x   Leuk Esterase: x / RBC: x / WBC x   Sq Epi: x / Non Sq Epi: x / Bacteria: x        Urine Cx: p[ending

## 2025-04-06 LAB
CULTURE RESULTS: NO GROWTH — SIGNIFICANT CHANGE UP
SPECIMEN SOURCE: SIGNIFICANT CHANGE UP

## 2025-04-09 ENCOUNTER — APPOINTMENT (OUTPATIENT)
Dept: PAIN MANAGEMENT | Facility: CLINIC | Age: 80
End: 2025-04-09

## 2025-04-11 ENCOUNTER — APPOINTMENT (OUTPATIENT)
Dept: ORTHOPEDIC SURGERY | Facility: CLINIC | Age: 80
End: 2025-04-11